# Patient Record
Sex: FEMALE | Race: WHITE | NOT HISPANIC OR LATINO
[De-identification: names, ages, dates, MRNs, and addresses within clinical notes are randomized per-mention and may not be internally consistent; named-entity substitution may affect disease eponyms.]

---

## 2020-02-10 ENCOUNTER — TRANSCRIPTION ENCOUNTER (OUTPATIENT)
Age: 35
End: 2020-02-10

## 2021-01-12 ENCOUNTER — NON-APPOINTMENT (OUTPATIENT)
Age: 36
End: 2021-01-12

## 2021-01-12 ENCOUNTER — APPOINTMENT (OUTPATIENT)
Dept: GYNECOLOGIC ONCOLOGY | Facility: CLINIC | Age: 36
End: 2021-01-12
Payer: COMMERCIAL

## 2021-01-12 VITALS
SYSTOLIC BLOOD PRESSURE: 119 MMHG | BODY MASS INDEX: 24.16 KG/M2 | WEIGHT: 145 LBS | HEIGHT: 65 IN | HEART RATE: 78 BPM | DIASTOLIC BLOOD PRESSURE: 73 MMHG

## 2021-01-12 DIAGNOSIS — Z80.3 FAMILY HISTORY OF MALIGNANT NEOPLASM OF BREAST: ICD-10-CM

## 2021-01-12 DIAGNOSIS — R63.5 ABNORMAL WEIGHT GAIN: ICD-10-CM

## 2021-01-12 DIAGNOSIS — Z87.2 PERSONAL HISTORY OF DISEASES OF THE SKIN AND SUBCUTANEOUS TISSUE: ICD-10-CM

## 2021-01-12 DIAGNOSIS — R23.2 FLUSHING: ICD-10-CM

## 2021-01-12 PROCEDURE — 99204 OFFICE O/P NEW MOD 45 MIN: CPT

## 2021-01-12 PROCEDURE — 99072 ADDL SUPL MATRL&STAF TM PHE: CPT

## 2021-01-12 RX ORDER — ALPRAZOLAM 2 MG/1
TABLET ORAL
Refills: 0 | Status: ACTIVE | COMMUNITY

## 2021-01-12 RX ORDER — DESOGESTREL/ETHINYL ESTRADIOL AND ETHINYL ESTRADIOL 21-5 (28)
KIT ORAL
Refills: 0 | Status: ACTIVE | COMMUNITY

## 2021-01-13 LAB — CANCER AG125 SERPL-ACNC: 63 U/ML

## 2021-01-23 ENCOUNTER — OUTPATIENT (OUTPATIENT)
Dept: OUTPATIENT SERVICES | Facility: HOSPITAL | Age: 36
LOS: 1 days | End: 2021-01-23

## 2021-01-23 ENCOUNTER — APPOINTMENT (OUTPATIENT)
Dept: CT IMAGING | Facility: CLINIC | Age: 36
End: 2021-01-23
Payer: COMMERCIAL

## 2021-01-23 PROCEDURE — 74177 CT ABD & PELVIS W/CONTRAST: CPT | Mod: 26

## 2021-03-23 ENCOUNTER — NON-APPOINTMENT (OUTPATIENT)
Age: 36
End: 2021-03-23

## 2021-03-23 LAB
ESTRADIOL SERPL-MCNC: 54 PG/ML
FSH SERPL-MCNC: 5.2 IU/L
TSH SERPL-ACNC: 0.63 UIU/ML

## 2022-01-12 NOTE — PHYSICAL EXAM
[Normal] : Adnexa(ae): Normal [de-identified] : Amirah Christopher MA was present the entire time of gynecological exam.

## 2022-01-12 NOTE — HISTORY OF PRESENT ILLNESS
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 She was previously being [place] [facility] [place]invasion was noted after [facility] [place]. She was evaluated with surveillance pelvic US and . She was initially seen by me in 2021 where we settled on a plan for annual surveillance, alternating with well woman GYN care with Dr. Elizabeth. CT a/p was warranted due to her reported symptoms over the 3-4 months prior to seeing me.\par \par CT a/p (21) - \par Findings of mild terminal ileitis/early Crohn's disease.\par Left oophorectomy without evidence of recurrent or metastatic disease.

## 2022-01-12 NOTE — END OF VISIT
[FreeTextEntry3] : Written by Amirah Christopher, acting as a scribe for Dr. Colleen oCffey.\par This note accurately reflects the work and decisions made by me.

## 2022-01-12 NOTE — ASSESSMENT
[FreeTextEntry1] : 37 y/o patient being followed for a history of serous borderline tumor. The patient is doing well from a gynecological standpoint.

## 2022-01-13 ENCOUNTER — APPOINTMENT (OUTPATIENT)
Dept: GYNECOLOGIC ONCOLOGY | Facility: CLINIC | Age: 37
End: 2022-01-13

## 2022-01-26 ENCOUNTER — FORM ENCOUNTER (OUTPATIENT)
Age: 37
End: 2022-01-26

## 2022-06-08 ENCOUNTER — APPOINTMENT (OUTPATIENT)
Dept: GYNECOLOGIC ONCOLOGY | Facility: CLINIC | Age: 37
End: 2022-06-08

## 2022-06-08 VITALS
HEIGHT: 65 IN | WEIGHT: 145 LBS | BODY MASS INDEX: 24.16 KG/M2 | SYSTOLIC BLOOD PRESSURE: 110 MMHG | DIASTOLIC BLOOD PRESSURE: 70 MMHG

## 2022-06-08 PROCEDURE — 99214 OFFICE O/P EST MOD 30 MIN: CPT

## 2022-06-08 RX ORDER — ONDANSETRON 4 MG/1
4 TABLET ORAL
Qty: 9 | Refills: 0 | Status: ACTIVE | COMMUNITY
Start: 2022-05-21

## 2022-06-08 RX ORDER — ALPRAZOLAM 0.25 MG/1
0.25 TABLET ORAL
Qty: 5 | Refills: 0 | Status: ACTIVE | COMMUNITY
Start: 2022-05-24

## 2022-06-08 RX ORDER — NORETHINDRONE ACETATE 5 MG/1
5 TABLET ORAL
Qty: 30 | Refills: 0 | Status: ACTIVE | COMMUNITY
Start: 2022-05-21

## 2022-06-08 RX ORDER — TRETINOIN 0.5 MG/G
0.05 CREAM TOPICAL
Qty: 45 | Refills: 0 | Status: ACTIVE | COMMUNITY
Start: 2022-04-09

## 2022-06-08 RX ORDER — TINIDAZOLE 500 MG/1
500 TABLET, FILM COATED ORAL
Qty: 10 | Refills: 0 | Status: ACTIVE | COMMUNITY
Start: 2022-03-02

## 2022-06-21 ENCOUNTER — RESULT REVIEW (OUTPATIENT)
Age: 37
End: 2022-06-21

## 2022-06-21 NOTE — REASON FOR VISIT
[FreeTextEntry1] : [place] Location\par \par [facility] [phone] at [address] [place] [id number] \par \par Hx of serous borderline tumor\par \par Hx of MAGDALENA III\par \par Most current: ASCUS, HR HPV+, 16/18 negative

## 2022-06-21 NOTE — END OF VISIT
[FreeTextEntry3] : Written by Amirah Christopher, acting as a scribe for Dr. Colleen Coffey.\par This note accurately reflects the work and decisions made by me.

## 2022-06-21 NOTE — ASSESSMENT
[FreeTextEntry1] : 37 y/o patient with hx of serous borderline tumor of the ovary. I discussed at length with the patient the risks, benefits, and alternatives to LEEP conization of the cervix.   She understands the risks to include (but not be limited to): cervical stenosis and possible infertility; infection with need for hospitalization; bleeding with need for transfusion; and cervical incompetence with difficulty holding future pregnancies to term.  The patient agrees to proceed.  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

## 2022-06-21 NOTE — HISTORY OF PRESENT ILLNESS
[FreeTextEntry1] : 37 y/o  with history of MAGDALENA III s/p LEEP in , now ASCUS, HR HPV + (16/18/45 negative) on most recent pap 3/2/22. Patient with hx of serous borderline tumor. In  she underwent a LSC LSO at [facility], with final pathology revealing a serous borderline tumor involving the serosal surface of the ovary. She was monitored by Dr. Flores (med/onc, [facility]), with a surveillance Pelvic US and .\par \par She had colpo done on 22 with multiple cervical biopsies. \par Cervix biopsy 3 o'clock: CIN1\par Cervix biopsy 5 o'clock: CIN2\par Cervix biopsy 9 o'clock: Cervical mucosa transformation zone w/ no diagnostic abnormalities\par Cervix biopsy 12 o'clock: Benign endocervical tissue\par ECC: Fragments of benign endocervix and interval phase endometrium\par \par Pelvic US (21) - \par RV uterus\par small amt of fluid within endometrial lining (pt at the tail of her menses. s/p left oophorectomy, cyst is now identified in the same region as previously described.

## 2022-06-21 NOTE — PHYSICAL EXAM
[Normal] : Recto-Vaginal Exam: Normal [FreeTextEntry1] : Amirah Christopher MA was present the entire time of gynecological exam.

## 2022-07-18 ENCOUNTER — FORM ENCOUNTER (OUTPATIENT)
Age: 37
End: 2022-07-18

## 2022-08-02 ENCOUNTER — OUTPATIENT (OUTPATIENT)
Dept: OUTPATIENT SERVICES | Facility: HOSPITAL | Age: 37
LOS: 1 days | End: 2022-08-02
Payer: COMMERCIAL

## 2022-08-02 DIAGNOSIS — Z01.818 ENCOUNTER FOR OTHER PREPROCEDURAL EXAMINATION: ICD-10-CM

## 2022-08-02 LAB
A1C WITH ESTIMATED AVERAGE GLUCOSE RESULT: 5.1 % — SIGNIFICANT CHANGE UP (ref 4–5.6)
ANION GAP SERPL CALC-SCNC: 8 MMOL/L — SIGNIFICANT CHANGE UP (ref 5–17)
BASOPHILS # BLD AUTO: 0.04 K/UL — SIGNIFICANT CHANGE UP (ref 0–0.2)
BASOPHILS NFR BLD AUTO: 0.6 % — SIGNIFICANT CHANGE UP (ref 0–2)
BLD GP AB SCN SERPL QL: SIGNIFICANT CHANGE UP
BUN SERPL-MCNC: 13.6 MG/DL — SIGNIFICANT CHANGE UP (ref 8–20)
CALCIUM SERPL-MCNC: 9.6 MG/DL — SIGNIFICANT CHANGE UP (ref 8.4–10.5)
CHLORIDE SERPL-SCNC: 103 MMOL/L — SIGNIFICANT CHANGE UP (ref 98–107)
CO2 SERPL-SCNC: 27 MMOL/L — SIGNIFICANT CHANGE UP (ref 22–29)
CREAT SERPL-MCNC: 0.85 MG/DL — SIGNIFICANT CHANGE UP (ref 0.5–1.3)
EGFR: 90 ML/MIN/1.73M2 — SIGNIFICANT CHANGE UP
EOSINOPHIL # BLD AUTO: 0.12 K/UL — SIGNIFICANT CHANGE UP (ref 0–0.5)
EOSINOPHIL NFR BLD AUTO: 1.7 % — SIGNIFICANT CHANGE UP (ref 0–6)
ESTIMATED AVERAGE GLUCOSE: 100 MG/DL — SIGNIFICANT CHANGE UP (ref 68–114)
GLUCOSE SERPL-MCNC: 97 MG/DL — SIGNIFICANT CHANGE UP (ref 70–99)
HCG SERPL-ACNC: <4 MIU/ML — SIGNIFICANT CHANGE UP
HCT VFR BLD CALC: 40.2 % — SIGNIFICANT CHANGE UP (ref 34.5–45)
HGB BLD-MCNC: 13.3 G/DL — SIGNIFICANT CHANGE UP (ref 11.5–15.5)
IMM GRANULOCYTES NFR BLD AUTO: 0.3 % — SIGNIFICANT CHANGE UP (ref 0–1.5)
LYMPHOCYTES # BLD AUTO: 2.02 K/UL — SIGNIFICANT CHANGE UP (ref 1–3.3)
LYMPHOCYTES # BLD AUTO: 28 % — SIGNIFICANT CHANGE UP (ref 13–44)
MCHC RBC-ENTMCNC: 30.6 PG — SIGNIFICANT CHANGE UP (ref 27–34)
MCHC RBC-ENTMCNC: 33.1 GM/DL — SIGNIFICANT CHANGE UP (ref 32–36)
MCV RBC AUTO: 92.4 FL — SIGNIFICANT CHANGE UP (ref 80–100)
MONOCYTES # BLD AUTO: 0.51 K/UL — SIGNIFICANT CHANGE UP (ref 0–0.9)
MONOCYTES NFR BLD AUTO: 7.1 % — SIGNIFICANT CHANGE UP (ref 2–14)
NEUTROPHILS # BLD AUTO: 4.51 K/UL — SIGNIFICANT CHANGE UP (ref 1.8–7.4)
NEUTROPHILS NFR BLD AUTO: 62.3 % — SIGNIFICANT CHANGE UP (ref 43–77)
PLATELET # BLD AUTO: 234 K/UL — SIGNIFICANT CHANGE UP (ref 150–400)
POTASSIUM SERPL-MCNC: 4.6 MMOL/L — SIGNIFICANT CHANGE UP (ref 3.5–5.3)
POTASSIUM SERPL-SCNC: 4.6 MMOL/L — SIGNIFICANT CHANGE UP (ref 3.5–5.3)
RBC # BLD: 4.35 M/UL — SIGNIFICANT CHANGE UP (ref 3.8–5.2)
RBC # FLD: 13.1 % — SIGNIFICANT CHANGE UP (ref 10.3–14.5)
SODIUM SERPL-SCNC: 138 MMOL/L — SIGNIFICANT CHANGE UP (ref 135–145)
WBC # BLD: 7.22 K/UL — SIGNIFICANT CHANGE UP (ref 3.8–10.5)
WBC # FLD AUTO: 7.22 K/UL — SIGNIFICANT CHANGE UP (ref 3.8–10.5)

## 2022-08-02 PROCEDURE — G0463: CPT

## 2022-08-15 LAB — SARS-COV-2 N GENE NPH QL NAA+PROBE: NOT DETECTED

## 2022-08-16 ENCOUNTER — RESULT REVIEW (OUTPATIENT)
Age: 37
End: 2022-08-16

## 2022-08-22 ENCOUNTER — NON-APPOINTMENT (OUTPATIENT)
Age: 37
End: 2022-08-22

## 2022-09-13 ENCOUNTER — NON-APPOINTMENT (OUTPATIENT)
Age: 37
End: 2022-09-13

## 2022-09-13 DIAGNOSIS — B96.89 ACUTE VAGINITIS: ICD-10-CM

## 2022-09-13 DIAGNOSIS — N76.0 ACUTE VAGINITIS: ICD-10-CM

## 2022-09-21 ENCOUNTER — APPOINTMENT (OUTPATIENT)
Dept: GYNECOLOGIC ONCOLOGY | Facility: CLINIC | Age: 37
End: 2022-09-21

## 2022-09-21 VITALS
DIASTOLIC BLOOD PRESSURE: 62 MMHG | BODY MASS INDEX: 24.16 KG/M2 | TEMPERATURE: 98.4 F | HEIGHT: 65 IN | WEIGHT: 145 LBS | SYSTOLIC BLOOD PRESSURE: 96 MMHG

## 2022-09-21 PROCEDURE — 99024 POSTOP FOLLOW-UP VISIT: CPT

## 2022-10-04 NOTE — DISCUSSION/SUMMARY
[Cervical Abnormality] : normal cervix [External Genitalia Abnormal] : normal external genitalia [Vaginal Exam Abnormal] : normal vaginal exam [de-identified] : Amirah Christopher MA was present the entire time of gynecological exam.  [FreeTextEntry1] : OPERATIVE FINDINGS: With Lugol solution, slight loss of uptake near the 5:00 position compatible with preoperative colposcopic-directed biopsies. No gross cervical lesions. Shallow LEEP cone biopsy performed. \par \par PATHOLOGY: \par 1. Cervix at 12 o'clock, LEEP-cone biopsy:\par \par - Benign squamous mucosa with acute, chronic inflammation and\par reactive changes.\par - Benign endocervical tissue.\par \par 2. Endocervix, post-LEEP curettings:\par - Benign endocervical and lower uterine segment tissue.\par \par 3. Left lateral margin of cervix, LEEP-cone biopsy:\par - Benign squamous and endocervical mucosa.\par \par Note: In parts 1 and 3, Each block has been reversed 180 degree and sectioned deeper levels for microscopic examination. There is no evidence of squamous intraepithelial lesion in all sections examined.

## 2022-10-04 NOTE — REASON FOR VISIT
[de-identified] : 08/16/22 [de-identified] : Pelvis EUA, LEEP, Cone biopsy of cervix for MAGDALENA 2-3 at [facility] [de-identified] : The patient is healing remarkably well. She denies a change in appetite, or a change in weight. She is tolerating PO without nausea or vomiting. She denies VB/VD. She denies CP/SOB.

## 2022-10-04 NOTE — ASSESSMENT
[FreeTextEntry1] : The patient is healing well without complication. We discussed ongoing recuperation and reviewed final pathology results in detail - a copy was provided to the patient. Aptima swab was obtained in office due to reported VD. I also ordered TV US for interval f/u of retained ovary to r/o recurrent borderline tumor. She should schedule telehealth to review. We reviewed the need for ongoing GYN visits and a recommended plan to follow up with regular GYN. Per patient request, she will return in 1 year to continue interval surveillance. She should have pelvic/TV US every 6 months. The patient stated and expressed a good understanding of the above information. \par \par Patient may be interested in fertility consult due to her age and since she has only one ovary, but will consider scheduling an appointment next year.

## 2022-11-15 ENCOUNTER — APPOINTMENT (OUTPATIENT)
Dept: GYNECOLOGIC ONCOLOGY | Facility: CLINIC | Age: 37
End: 2022-11-15

## 2022-11-15 ENCOUNTER — APPOINTMENT (OUTPATIENT)
Dept: ULTRASOUND IMAGING | Facility: CLINIC | Age: 37
End: 2022-11-15

## 2022-11-15 PROCEDURE — 76705 ECHO EXAM OF ABDOMEN: CPT

## 2022-11-15 PROCEDURE — 76830 TRANSVAGINAL US NON-OB: CPT

## 2022-11-15 PROCEDURE — 76856 US EXAM PELVIC COMPLETE: CPT

## 2022-11-15 PROCEDURE — 99442: CPT

## 2022-11-28 NOTE — END OF VISIT
[Time Spent: ___ minutes] : I have spent [unfilled] minutes of time on the encounter. [FreeTextEntry3] : Written by Amirah Christopher, acting as a scribe for Dr. Colleen Coffey.\par This note accurately reflects the work and decisions made by me.

## 2022-11-28 NOTE — HISTORY OF PRESENT ILLNESS
[FreeTextEntry1] : 38 y/o patient s/p Pelvic EUA, LEEP, Cone biopsy of cervix for MAGDALENA 2-3 on 8/16/22. Patient was seen postoperatively on 9/21/222 when she was advised to resume routine well woman care. Per her request, patient will continue annual f/u with me given her hx of borderline tumor of the ovary. I recommended she have pelvic/TV US done every 6 months. She presents today to review most recent US.  Her only symptom would be intermittent bloating which she associates with known IBS. \par \par Pelvic/TV US (11/15/22) - \par - Fibroid uterus with small fibroid nodules.\par - Normal endometrial lining.\par - Patient is status post left oophorectomy.\par - Right ovary contains a cyst with solid extension. If this had been present on previous examination, it is now larger in size. Close follow-up or MRI of the pelvis is recommended for further evaluation. Small dermoid cyst more difficult to see on today's exam.

## 2022-11-28 NOTE — ASSESSMENT
[FreeTextEntry1] : 36 y/o patient with hx of MAGDALENA 2-3 & borderline tumor of the ovary. I reviewed her recent pelvic US which shows some growth in the known cyst within the R ovary. Given these findings, I would recommend patient have ALEJANDRO blood test & MRI A/P to determine whether she is developing another borderline tumor in the right ovary given complex appearance when compared with US from 1 year ago. \par \par Patient unsure of childbearing status at the present moment. She should f/u after her MRI & blood work are done to review these results.

## 2022-11-28 NOTE — REASON FOR VISIT
[Patient] : the patient [Self] : self [Home] : at home, [unfilled] , at the time of the visit. [Medical Office: ([facility])___] : at the medical office located in  [Verbal consent obtained from patient] : the patient, [unfilled] [FreeTextEntry1] : [place] Location\par \par [facility] [phone] at [address] [place] [phone] \par \par Hx of borderline tumor of the ovary\par \par MAGDALENA 2-3

## 2022-11-29 ENCOUNTER — RESULT REVIEW (OUTPATIENT)
Age: 37
End: 2022-11-29

## 2022-11-29 ENCOUNTER — OUTPATIENT (OUTPATIENT)
Dept: OUTPATIENT SERVICES | Facility: HOSPITAL | Age: 37
LOS: 1 days | End: 2022-11-29

## 2022-11-29 ENCOUNTER — APPOINTMENT (OUTPATIENT)
Dept: MRI IMAGING | Facility: CLINIC | Age: 37
End: 2022-11-29

## 2022-11-29 PROCEDURE — 74183 MRI ABD W/O CNTR FLWD CNTR: CPT | Mod: 26

## 2022-11-29 PROCEDURE — 72197 MRI PELVIS W/O & W/DYE: CPT | Mod: 26

## 2022-12-05 LAB
CA 125 (LABCORP): 20.5 U/ML
HE4X: 46.5 PMOL/L
POSTMENOPAUSAL ROMA: 1.32
PREMENOPAUSAL ROMA: 0.65
ROMA COMMENT: NORMAL

## 2022-12-08 ENCOUNTER — APPOINTMENT (OUTPATIENT)
Dept: GYNECOLOGIC ONCOLOGY | Facility: CLINIC | Age: 37
End: 2022-12-08

## 2022-12-08 PROCEDURE — 99442: CPT

## 2022-12-14 ENCOUNTER — FORM ENCOUNTER (OUTPATIENT)
Age: 37
End: 2022-12-14

## 2022-12-14 ENCOUNTER — APPOINTMENT (OUTPATIENT)
Dept: GYNECOLOGIC ONCOLOGY | Facility: CLINIC | Age: 37
End: 2022-12-14

## 2022-12-14 PROCEDURE — 99442: CPT

## 2022-12-15 NOTE — DISCUSSION/SUMMARY
[Pre Op] : The differential diagnosis was discussed in detail. The indications, risks, benefits and alternatives were discussed. [unfilled] expressed an understanding of the treatment rationale and her questions were answered to her apparent satisfaction.

## 2022-12-20 NOTE — REASON FOR VISIT
[Medical Office: ([facility])___] : at the medical office located in  [Verbal consent obtained from patient] : the patient, [unfilled] [Other Location: e.g. School (Enter Location, City,State)___] : at [unfilled], at the time of the visit. [FreeTextEntry1] : Hx of borderline tumor of the ovary \par \par MAGDALENA 2-3

## 2022-12-20 NOTE — ASSESSMENT
[FreeTextEntry1] : 38 y/o patient with a history of borderline tumor of the L ovary, now with R ovarian cyst on MRI & normal ALEJANDRO.  Patient is interested in preserving her ovaries as she is not ready to endure surgically induced menopause. Based on the imaging showing cystic appearance & only partial involvement of the ovary, I do not find it unreasonable to retain the R ovary although I am inclined to believe that final path will show recurrent borderline tumor in the R ovary.\par \par Patient is a nurse. We discussed 2 week minimum time off of work for surgery.  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 Patient interested in scheduling surgery for early January.

## 2022-12-20 NOTE — HISTORY OF PRESENT ILLNESS
[FreeTextEntry1] : 38 y/o patient s/p Pelvic EUA, LEEP, Cone biopsy of cervix for MAGDALENA 2-3 on 8/16/22. Patient was seen postoperatively on 9/21/222 when she was advised to resume routine well woman care. Per her request, patient will continue annual f/u with me given her hx of borderline tumor of the ovary. I recommended she have pelvic/TV US done every 6 months. We had a teleleaht on 11/15/22 to review her US. Her only symptom was intermittent bloating which she associates with known IBS. \par \par Pelvic/TV US (11/15/22) - \par - Fibroid uterus with small fibroid nodules.\par - Normal endometrial lining.\par - Patient is status post left oophorectomy.\par - Right ovary contains a cyst with solid extension. If this had been present on previous examination, it is now larger in size. Close follow-up or MRI of the pelvis is recommended for further evaluation. Small dermoid cyst more difficult to see on today's exam. \par \par I reviewed US findings with patient which showed some growth in the known cyst within the R ovary. I recommended patient have a ALEJANDRO blood test & MRI A/P to determine whether she is developing another borderline tumor on the right ovary given complex appearance  when compared to US from 1 year ago \par \par Patient was unsure of her child bearing status at last visit. She has a telehealth visit today to discuss results. \par \par MRI abdomen and pelvis 11/29/22: IMPRESSION:\par 3.3 cm right ovarian cystic lesion with multiple enhancing mural nodules suspicious for neoplasm. It has enlarged from 2021.\par 2 cm simple cyst left adnexa, mildly enlarged from 2021, ?peritoneal inclusion/ paraovarian cyst versus cystic neoplasm, suggest follow-up.\par No lymphadenopathy or metastatic disease.\par \par ALEJANDRO: 12/2/22: POST 1.32\par : 20.5\par PRE: 0.65\par HE4: 46.5\par \par I left a message for patient on 12/5/22 with interest in discussing ALEJANDRO results and recent pelvis US and MRI imaging. Recommend LSC R ovarian cystectomy, possible oophorectomy and consideration of DANETTE consult prior to oocyte cryopreservation. \par

## 2022-12-20 NOTE — HISTORY OF PRESENT ILLNESS
[FreeTextEntry1] : 38 y/o patient with a hx of borderline tumor of the ovary & high grade cervical dysplasia, s/p Pelvic EUA, LEEP, Cone biopsy of cervix for MAGDALENA 2-3 on 8/16/22. Pelvic US done on 11/15/22 to r/o recurrent borderline tumor revealed growth in the known cyst within the R ovary. MRI A/P & ALEJANDRO subsequently ordered for additional evaluation given complex appearance when compared to prior US.  I am reassured by normal ALEJANDRO result although due to imaging characteristic of R ovarian cyst, we discussed my concern for recurrent borderline tumor provided that MRI shows larger ovaries & enhancing mural nodules.  I recommended LSC R ovarian cystectomy, possible oophorectomy and consideration of DANETTE consult prior to oocyte cryopreservation - Dr. Ai Rene information was provided to the pt.  Patient has decided to discontinue oocyte preservation provided that she is  & is not inclined to proceed with another procedure. \par \par Potential plan for PEUA RA Lap right ovarian cystectomy with FS, possible R oophorectomy, with intent of pathologist review to r/o borderline tumor. If confirmed as cancer, plan to resect the R ovary in addition to staging & lymph node sampling.   As long as uterus surface is not diseased, can plan for fertility sparing procedure.\par \par \par MRI abdomen and pelvis 11/29/22: IMPRESSION:\par 3.3 cm right ovarian cystic lesion with multiple enhancing mural nodules suspicious for neoplasm. It has enlarged from 2021.\par 2 cm simple cyst left adnexa, mildly enlarged from 2021, ?peritoneal inclusion/ paraovarian cyst versus cystic neoplasm, suggest follow-up.\par No lymphadenopathy or metastatic disease.\par \par ALEJANDRO: 12/2/22: POST 1.32\par : 20.5\par PRE: 0.65\par HE4: 46.5\par

## 2022-12-20 NOTE — REASON FOR VISIT
[Patient] : the patient [Self] : self [Home] : at home, [unfilled] , at the time of the visit. [Medical Office: ([facility])___] : at the medical office located in  [Verbal consent obtained from patient] : the patient, [unfilled] [FreeTextEntry1] : [place] Location\par \par [facility] [phone] at [address] [place] [phone] \par \par Hx of borderline tumor of the ovary \par \par MAGDALENA 2-3 \par \par Discuss final treatment plan

## 2022-12-20 NOTE — ASSESSMENT
[FreeTextEntry1] : Patient reports receiving my messages. \par \par I discussed with patient that the ALEJANDRO blood test was important to help strategize and make decision for patient. ALEJANDRO WNL. Imaging characteristic of the ovary is not ideal, based on Sonogram and MRI warrant a confirmatory image of laparoscopic surgical evaluation with understanding if we do it in January or Febuary would need US prior to make sure finding is still there. Naturally because of the fact features enhancing mural nodules, ovaries a bit larger, could be compatible with recurrent borderline tumor in this ovary. \par As long as ALEJANDRO is wnl it buys time to have cautiously scheduled surgery and give time to think about fertility care. Patient was considering freezing her eggs. Can recap when she gets back from [place]. If she is considering future child bearing patient should have a consultation with Dr. Artemio Rene fertility specialist. \par \par I explained that cyst removal will be my goal however if this is a borderline tumor there is a chance of losing ovary. Discussed 15-20 percent chance repeat surgery to remove ovary if it recurs \par \par RA Lap right ovarian cystectomy with FS with intent of pathologist to look and see if they see borderline tumor. If they tell me its cancer we will have agreement that ovary will come out and staging with lymph node samples. \par As long as uterus surface not diseased we can leave uterus and fertility sparing procedure. depending on final path results will discuss further treatment if necessary. \par \par Since patient is at [place] at this time My office will message via Otilia Rene fertility specialist info. \par \par [place] or [facility] system.  Detail Level: Detailed Quality 110: Preventive Care And Screening: Influenza Immunization: Influenza Immunization previously received during influenza season Quality 130: Documentation Of Current Medications In The Medical Record: Current Medications Documented Quality 111:Pneumonia Vaccination Status For Older Adults: Pneumococcal Vaccination Previously Received

## 2022-12-20 NOTE — PHYSICAL EXAM
[Normal] : Mood and affect: Normal [FreeTextEntry1] : Anali Waters Medical assistant was present during telehealth visit

## 2022-12-20 NOTE — END OF VISIT
[Time Spent: ___ minutes] : I have spent [unfilled] minutes of time on the encounter. [FreeTextEntry3] : Written by Anali Waters, acting as a scribe for Dr. Colleen Neff This note accurately reflects the work and decisions made by me.

## 2022-12-27 ENCOUNTER — NON-APPOINTMENT (OUTPATIENT)
Age: 37
End: 2022-12-27

## 2022-12-28 ENCOUNTER — OUTPATIENT (OUTPATIENT)
Dept: OUTPATIENT SERVICES | Facility: HOSPITAL | Age: 37
LOS: 1 days | End: 2022-12-28
Payer: COMMERCIAL

## 2022-12-28 VITALS
DIASTOLIC BLOOD PRESSURE: 64 MMHG | RESPIRATION RATE: 18 BRPM | OXYGEN SATURATION: 98 % | TEMPERATURE: 99 F | SYSTOLIC BLOOD PRESSURE: 100 MMHG | HEART RATE: 91 BPM | HEIGHT: 65 IN | WEIGHT: 133.38 LBS

## 2022-12-28 DIAGNOSIS — D39.12 NEOPLASM OF UNCERTAIN BEHAVIOR OF LEFT OVARY: ICD-10-CM

## 2022-12-28 DIAGNOSIS — Z90.721 ACQUIRED ABSENCE OF OVARIES, UNILATERAL: Chronic | ICD-10-CM

## 2022-12-28 DIAGNOSIS — Z98.82 BREAST IMPLANT STATUS: Chronic | ICD-10-CM

## 2022-12-28 DIAGNOSIS — Z29.9 ENCOUNTER FOR PROPHYLACTIC MEASURES, UNSPECIFIED: ICD-10-CM

## 2022-12-28 DIAGNOSIS — Z98.890 OTHER SPECIFIED POSTPROCEDURAL STATES: Chronic | ICD-10-CM

## 2022-12-28 DIAGNOSIS — Z01.818 ENCOUNTER FOR OTHER PREPROCEDURAL EXAMINATION: ICD-10-CM

## 2022-12-28 LAB
A1C WITH ESTIMATED AVERAGE GLUCOSE RESULT: 5 % — SIGNIFICANT CHANGE UP (ref 4–5.6)
ALBUMIN SERPL ELPH-MCNC: 4.3 G/DL — SIGNIFICANT CHANGE UP (ref 3.3–5.2)
ALP SERPL-CCNC: 49 U/L — SIGNIFICANT CHANGE UP (ref 40–120)
ALT FLD-CCNC: 18 U/L — SIGNIFICANT CHANGE UP
ANION GAP SERPL CALC-SCNC: 11 MMOL/L — SIGNIFICANT CHANGE UP (ref 5–17)
APTT BLD: 29 SEC — SIGNIFICANT CHANGE UP (ref 27.5–35.5)
AST SERPL-CCNC: 18 U/L — SIGNIFICANT CHANGE UP
BASOPHILS # BLD AUTO: 0.06 K/UL — SIGNIFICANT CHANGE UP (ref 0–0.2)
BASOPHILS NFR BLD AUTO: 0.8 % — SIGNIFICANT CHANGE UP (ref 0–2)
BILIRUB SERPL-MCNC: 1.9 MG/DL — SIGNIFICANT CHANGE UP (ref 0.4–2)
BLD GP AB SCN SERPL QL: SIGNIFICANT CHANGE UP
BUN SERPL-MCNC: 12.5 MG/DL — SIGNIFICANT CHANGE UP (ref 8–20)
CALCIUM SERPL-MCNC: 9.3 MG/DL — SIGNIFICANT CHANGE UP (ref 8.4–10.5)
CHLORIDE SERPL-SCNC: 103 MMOL/L — SIGNIFICANT CHANGE UP (ref 96–108)
CO2 SERPL-SCNC: 27 MMOL/L — SIGNIFICANT CHANGE UP (ref 22–29)
CREAT SERPL-MCNC: 0.7 MG/DL — SIGNIFICANT CHANGE UP (ref 0.5–1.3)
EGFR: 113 ML/MIN/1.73M2 — SIGNIFICANT CHANGE UP
EOSINOPHIL # BLD AUTO: 0.11 K/UL — SIGNIFICANT CHANGE UP (ref 0–0.5)
EOSINOPHIL NFR BLD AUTO: 1.4 % — SIGNIFICANT CHANGE UP (ref 0–6)
ESTIMATED AVERAGE GLUCOSE: 97 MG/DL — SIGNIFICANT CHANGE UP (ref 68–114)
GLUCOSE SERPL-MCNC: 83 MG/DL — SIGNIFICANT CHANGE UP (ref 70–99)
HCG SERPL-ACNC: <4 MIU/ML — SIGNIFICANT CHANGE UP
HCT VFR BLD CALC: 40.8 % — SIGNIFICANT CHANGE UP (ref 34.5–45)
HGB BLD-MCNC: 13.7 G/DL — SIGNIFICANT CHANGE UP (ref 11.5–15.5)
IMM GRANULOCYTES NFR BLD AUTO: 0.3 % — SIGNIFICANT CHANGE UP (ref 0–0.9)
INR BLD: 1 RATIO — SIGNIFICANT CHANGE UP (ref 0.88–1.16)
LYMPHOCYTES # BLD AUTO: 2.1 K/UL — SIGNIFICANT CHANGE UP (ref 1–3.3)
LYMPHOCYTES # BLD AUTO: 27.2 % — SIGNIFICANT CHANGE UP (ref 13–44)
MAGNESIUM SERPL-MCNC: 1.9 MG/DL — SIGNIFICANT CHANGE UP (ref 1.6–2.6)
MCHC RBC-ENTMCNC: 30.4 PG — SIGNIFICANT CHANGE UP (ref 27–34)
MCHC RBC-ENTMCNC: 33.6 GM/DL — SIGNIFICANT CHANGE UP (ref 32–36)
MCV RBC AUTO: 90.5 FL — SIGNIFICANT CHANGE UP (ref 80–100)
MONOCYTES # BLD AUTO: 0.56 K/UL — SIGNIFICANT CHANGE UP (ref 0–0.9)
MONOCYTES NFR BLD AUTO: 7.3 % — SIGNIFICANT CHANGE UP (ref 2–14)
NEUTROPHILS # BLD AUTO: 4.86 K/UL — SIGNIFICANT CHANGE UP (ref 1.8–7.4)
NEUTROPHILS NFR BLD AUTO: 63 % — SIGNIFICANT CHANGE UP (ref 43–77)
PHOSPHATE SERPL-MCNC: 3.5 MG/DL — SIGNIFICANT CHANGE UP (ref 2.4–4.7)
PLATELET # BLD AUTO: 227 K/UL — SIGNIFICANT CHANGE UP (ref 150–400)
POTASSIUM SERPL-MCNC: 4.3 MMOL/L — SIGNIFICANT CHANGE UP (ref 3.5–5.3)
POTASSIUM SERPL-SCNC: 4.3 MMOL/L — SIGNIFICANT CHANGE UP (ref 3.5–5.3)
PROT SERPL-MCNC: 6.7 G/DL — SIGNIFICANT CHANGE UP (ref 6.6–8.7)
PROTHROM AB SERPL-ACNC: 11.6 SEC — SIGNIFICANT CHANGE UP (ref 10.5–13.4)
RBC # BLD: 4.51 M/UL — SIGNIFICANT CHANGE UP (ref 3.8–5.2)
RBC # FLD: 13.1 % — SIGNIFICANT CHANGE UP (ref 10.3–14.5)
SODIUM SERPL-SCNC: 141 MMOL/L — SIGNIFICANT CHANGE UP (ref 135–145)
WBC # BLD: 7.71 K/UL — SIGNIFICANT CHANGE UP (ref 3.8–10.5)
WBC # FLD AUTO: 7.71 K/UL — SIGNIFICANT CHANGE UP (ref 3.8–10.5)

## 2022-12-28 PROCEDURE — G0463: CPT

## 2022-12-28 NOTE — H&P PST ADULT - GASTROINTESTINAL
details… normal/soft/nontender/nondistended/normal active bowel sounds normal/soft/nontender/nondistended/normal active bowel sounds/no guarding/no rigidity/no organomegaly/no palpable jimi/no masses palpable

## 2022-12-28 NOTE — H&P PST ADULT - ATTENDING COMMENTS
Patient seen in presurgical holding with her mother present for the informed consent discussion.  R/B/A were reviewed & understood; consent is signed & witnessed in the chart.

## 2022-12-28 NOTE — H&P PST ADULT - NEUROLOGICAL
H/O gastroesophageal reflux (GERD)    HTN (hypertension)    OA (osteoarthritis)     normal/cranial nerves II-XII intact/sensation intact negative

## 2022-12-28 NOTE — H&P PST ADULT - BMI (KG/M2)
CHIEF COMPLAINT: Hip pain better. no nausea or vomiting or fever. uncontrolled blood sugars and blood pressures still no active gross bleeding on eliquis       PHYSICAL EXAM:  GENERAL: well built, well nourished, no acute distress   CHEST/LUNG: Clear to ausculation bilaterally, no wheezing, no crackles   HEART: Regular rate and rhythm; No murmurs, rubs  ABDOMEN: Soft, Nontender, Nondistended; Bowel sounds present  EXTREMITIES:  s/p left foot amputation. dressing +. no cyanosis.   NERVOUS SYSTEM:  Grossly non focal.  Psychiatry: AAO x 3, mood is appropriate     -----------------------------------------------------------------------------------------------------------------------------------------------------------------  OBJECTIVE DATA:                     Vital Signs Last 24 Hrs  T(C): 36.6 (15 Yann 2020 11:50), Max: 36.6 (15 Yann 2020 11:50)  T(F): 97.9 (15 Yann 2020 11:50), Max: 97.9 (15 Yann 2020 11:50)  HR: 69 (15 Yann 2020 11:50) (69 - 95)  BP: 148/79 (15 Yann 2020 11:50) (148/79 - 190/90)  BP(mean): --  RR: 16 (15 Yann 2020 11:50) (16 - 18)  SpO2: 98% (15 Yann 2020 04:52) (98% - 99%)           Daily     Daily Weight in k (15 Yann 2020 04:52)      LABS:                        8.2    20.05 )-----------( 533      ( 15 Yann 2020 07:59 )             25.5             01-15    136  |  105  |  33<H>  ----------------------------<  229<H>  4.6   |  25  |  1.70<H>    Ca    8.7      15 Yann 2020 07:59                        Interval Radiology studies: reviewed     MEDICATIONS  (STANDING):  clindamycin IVPB 900 milliGRAM(s) IV Intermittent every 8 hours  dextrose 5%. 1000 milliLiter(s) (50 mL/Hr) IV Continuous <Continuous>  dextrose 50% Injectable 12.5 Gram(s) IV Push once  dextrose 50% Injectable 25 Gram(s) IV Push once  dextrose 50% Injectable 25 Gram(s) IV Push once  diltiazem    milliGRAM(s) Oral daily  doxazosin 2 milliGRAM(s) Oral at bedtime  hydrALAZINE 50 milliGRAM(s) Oral three times a day  insulin glargine Injectable (LANTUS) 17 Unit(s) SubCutaneous at bedtime  insulin lispro (HumaLOG) corrective regimen sliding scale   SubCutaneous three times a day before meals  insulin lispro (HumaLOG) corrective regimen sliding scale   SubCutaneous at bedtime  insulin lispro Injectable (HumaLOG) 5 Unit(s) SubCutaneous three times a day before meals  lidocaine   Patch 1 Patch Transdermal every 24 hours  melatonin 3 milliGRAM(s) Oral at bedtime  nystatin Cream 1 Application(s) Topical every 12 hours  penicillin   G  potassium  IVPB 4 Million Unit(s) IV Intermittent every 4 hours  saccharomyces boulardii 250 milliGRAM(s) Oral two times a day    MEDICATIONS  (PRN):  acetaminophen   Tablet .. 650 milliGRAM(s) Oral every 6 hours PRN Temp greater or equal to 38C (100.4F), Mild Pain (1 - 3)  ALBUTerol   0.042% 1.25 milliGRAM(s) Nebulizer four times a day PRN Wheezing  dextrose 40% Gel 15 Gram(s) Oral once PRN Blood Glucose LESS THAN 70 milliGRAM(s)/deciliter  glucagon  Injectable 1 milliGRAM(s) IntraMuscular once PRN Glucose LESS THAN 70 milligrams/deciliter  guaiFENesin   Syrup  (Sugar-Free) 100 milliGRAM(s) Oral every 6 hours PRN Cough  hydrALAZINE Injectable 10 milliGRAM(s) IV Push every 6 hours PRN SUSTAINED SBP >180 OR DBP >100  morphine  - Injectable 2 milliGRAM(s) IV Push every 4 hours PRN Severe Pain (7 - 10)  oxycodone    5 mG/acetaminophen 325 mG 1 Tablet(s) Oral every 4 hours PRN Moderate Pain (4 - 6)  sodium chloride 0.65% Nasal 1 Spray(s) Both Nostrils three times a day PRN Nasal Congestion 22.2

## 2022-12-28 NOTE — H&P PST ADULT - HISTORY OF PRESENT ILLNESS
Pt  , LMP 2022had borderline ovarian tumor many about nine years of the left side  intermittent abdominal pain, occasional bloating, nausea/emisis 10 lbs weight loss within the last one year. This routine songogram since left side surgery  revealed abnormality since    37 years old female , LMP 2022, seen today pre-op for Pelvic exam under anesthesia, Robotic assisted  laparoscopic right ovary cystectomy with frozen section, possible oophorectomy, possible staging. 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  Pt has since been on surveillance monitoring of right ovary.  Pt reports  intermittent abdominal pain, occasional bloating, nausea, about 10 lbs weight loss within the last one year. Pt seen today for a scheduled surgery on 2023 by Dr. Coffey

## 2022-12-28 NOTE — H&P PST ADULT - ASSESSMENT
37 years old female , LMP 2022, seen today pre-op for Pelvic exam under anesthesia, Robotic assisted  laparoscopic right ovary cystectomy with frozen section, possible oophorectomy, possible staging. 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  Pt has since been on surveillance monitoring of right ovary.  Pt reports  intermittent abdominal pain, occasional bloating, nausea, about 10 lbs weight loss within the last one year. Pt seen today for a scheduled surgery on 2023 by Dr. Coffey. Surgery protocol reviewed with Pt today. Pt provided instructions for COVID PCR test requirement prior to this surgery. Pt to follow-up with PCP for medical clearances as per Dr. Merritt office request   KEITHI VTE 2.0 SCORE [CLOT updated 2019]    AGE RELATED RISK FACTORS                                                       MOBILITY RELATED FACTORS  [ ] Age 41-60 years                                            (1 Point)                    [ ] Bed rest                                                        (1 Point)  [ ] Age: 61-74 years                                           (2 Points)                  [ ] Plaster cast                                                   (2 Points)  [ ] Age= 75 years                                              (3 Points)                    [ ] Bed bound for more than 72 hours                 (2 Points)    DISEASE RELATED RISK FACTORS                                               GENDER SPECIFIC FACTORS  [ ] Edema in the lower extremities                       (1 Point)              [ ] Pregnancy                                                     (1 Point)  [ ] Varicose veins                                               (1 Point)                     [ ] Post-partum < 6 weeks                                   (1 Point)             [ ] BMI > 25 Kg/m2                                            (1 Point)                     [ ] Hormonal therapy  or oral contraception          (1 Point)                 [ ] Sepsis (in the previous month)                        (1 Point)               [ ] History of pregnancy complications                 (1 point)  [ ] Pneumonia or serious lung disease                                               [ ] Unexplained or recurrent                     (1 Point)           (in the previous month)                               (1 Point)  [ ] Abnormal pulmonary function test                     (1 Point)                 SURGERY RELATED RISK FACTORS  [ ] Acute myocardial infarction                              (1 Point)               [ ]  Section                                             (1 Point)  [ ] Congestive heart failure (in the previous month)  (1 Point)      [ ] Minor surgery                                                  (1 Point)   [ ] Inflammatory bowel disease                             (1 Point)               [ ] Arthroscopic surgery                                        (2 Points)  [ ] Central venous access                                      (2 Points)                [x ] General surgery lasting more than 45 minutes (2 points)  [ ] Malignancy- Present or previous                   (2 Points)                [ ] Elective arthroplasty                                         (5 points)    [ ] Stroke (in the previous month)                          (5 Points)                                                                                                                                                           HEMATOLOGY RELATED FACTORS                                                 TRAUMA RELATED RISK FACTORS  [ ] Prior episodes of VTE                                     (3 Points)                [ ] Fracture of the hip, pelvis, or leg                       (5 Points)  [ ] Positive family history for VTE                         (3 Points)             [ ] Acute spinal cord injury (in the previous month)  (5 Points)  [ ] Prothrombin 97591 A                                     (3 Points)               [ ] Paralysis  (less than 1 month)                             (5 Points)  [ ] Factor V Leiden                                             (3 Points)                  [ ] Multiple Trauma within 1 month                        (5 Points)  [ ] Lupus anticoagulants                                     (3 Points)                                                           [ ] Anticardiolipin antibodies                               (3 Points)                                                       [ ] High homocysteine in the blood                      (3 Points)                                             [ ] Other congenital or acquired thrombophilia      (3 Points)                                                [ ] Heparin induced thrombocytopenia                  (3 Points)                                     Total Score [     2     ]  OPIOID RISK TOOL    FRANCISCA EACH BOX THAT APPLIES AND ADD TOTALS AT THE END    FAMILY HISTORY OF SUBSTANCE ABUSE                 FEMALE         MALE                                                Alcohol                             [  ]1 pt          [  ]3pts                                               Illegal Durgs                     [  ]2 pts        [  ]3pts                                               Rx Drugs                           [  ]4 pts        [  ]4 pts    PERSONAL HISTORY OF SUBSTANCE ABUSE                                                                                          Alcohol                             [  ]3 pts       [  ]3 pts                                               Illegal Drugs                     [  ]4 pts        [  ]4 pts                                               Rx Drugs                           [  ]5 pts        [  ]5 pts    AGE BETWEEN 16-45 YEARS                                      [  ]1 pt         [  ]1 pt    HISTORY OF PREADOLESCENT   SEXUAL ABUSE                                                             [  ]3 pts        [  ]0pts    PSYCHOLOGICAL DISEASE                     ADD, OCD, Bipolar, Schizophrenia        [  ]2 pts         [  ]2 pts                      Depression                                               [  ]1 pt           [  ]1 pt           SCORING TOTAL   (add numbers and type here)              (**0*)                                     A score of 3 or lower indicated LOW risk for future opioid abuse  A score of 4 to 7 indicated moderate risk for future opioid abuse  A score of 8 or higher indicates a high risk for opioid abuse

## 2022-12-28 NOTE — H&P PST ADULT - NSICDXFAMILYHX_GEN_ALL_CORE_FT
FAMILY HISTORY:  Sibling  Still living? Yes, Estimated age: Age Unknown  FHx: breast cancer, Age at diagnosis: Age Unknown  FHx: thyroid cancer, Age at diagnosis: Age Unknown    Grandparent  Still living? No  Family hx of lung cancer, Age at diagnosis: Age Unknown

## 2022-12-28 NOTE — H&P PST ADULT - PSYCHIATRIC
normal/normal affect/alert and oriented x3/normal behavior negative details… normal affect/alert and oriented x3/normal behavior/anxious

## 2022-12-28 NOTE — H&P PST ADULT - PROBLEM SELECTOR PLAN 2
Pelvic exam under anesthesia, Robotic assisted  laparoscopic right ovary cystectomy with frozen section, possible oophorectomy, possible staging. Follow-up with PCP for medical clearance

## 2022-12-28 NOTE — H&P PST ADULT - NSICDXPASTMEDICALHX_GEN_ALL_CORE_FT
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

## 2023-01-05 NOTE — ASU PATIENT PROFILE, ADULT - WAS YOUR LAST COVID-19 VACCINE GREATER THAN OR EQUAL TO TWO MONTHS AGO?
GYN Clinic Note    Encounter Date: 2019    Subjective:     Chief Complaint:   Chief Complaint   Patient presents with   • Vaginal Problem     X 2 months          HPI:  Patient comes today for 2 months of vaginal itching.  She notes she recently had run out of her of her diabetes medications as she was switching insurances.  Admits sugars have been above 200.  Was recently put on insulin by Dr. Rachel.  Had tried Diflucan a couple of times.  It worked but then after a couple of days that she will come back.       all .     Past medical history, family history and surgical history reviewed and updated in Epic    Medications:  Current Outpatient Medications   Medication Sig Dispense Refill   • fluconazole (DIFLUCAN) 150 MG tablet Take one tablet every three days for three doses. 3 tablet 0   • metFORMIN (GLUCOPHAGE-XR) 500 MG 24 hr tablet Take 2 tablets by mouth daily (with breakfast). 180 tablet 0   • Alcohol Swabs (ALCOHOL PADS) 70 % Pads Apply 1 Units topically 2 times daily. USE AS DIRECTED TWICE A  each 3   • blood glucose (ROBIN CONTOUR NEXT TEST) test strip CHECK TWICE DAILY 100 strip 3   • Lancets (MICROLET) Misc test twice a day 100 each 3   • Alogliptin-Pioglitazone 25-30 MG Tab Take 1 tablet by mouth daily. 90 tablet 3   • ibuprofen (MOTRIN) 600 MG tablet Take 1 tablet by mouth every 6 hours as needed for Pain. 30 tablet 0   • insulin glargine (LANTUS SOLOSTAR, BASAGLAR) 100 UNIT/ML pen-injector Inject 40 Units into the skin nightly. 15 mL 5   • clotrimazole-betamethasone (LOTRISONE) 1-0.05 % cream Apply topically 2 times daily. 30 g 0   • Insulin Pen Needle 31G X 5 MM Misc Use to inject insulin 1 times daily. Remove needle cover(s) to expose needle before injecting. 100 each 3   • ranitidine (ZANTAC) 150 MG tablet Take 1 tablet by mouth 2 times daily. 60 tablet 5   • atorvastatin (LIPITOR) 10 MG tablet Take 1 tablet by mouth daily. 90 tablet 3   • lisinopril (ZESTRIL) 20 MG tablet  Take 1 tablet by mouth daily. TAKE 1 TABLET DAILY 90 tablet 3   • fluticasone (FLONASE) 50 MCG/ACT nasal spray TWO SPRAYS IN EACH NOSTRIL ONCE DAILY       No current facility-administered medications for this visit.        Allergies:   ALLERGIES:  Penicillins    Social Hx:  reports that she has never smoked. She has never used smokeless tobacco. She reports that she does not drink alcohol or use drugs.      Objective:    PE:  Vitals:    11/25/19 1342   BP: 128/84   Pulse: 91   Resp: 16   Temp: 98.4 °F (36.9 °C)   TempSrc: Tympanic   SpO2: 98%   Weight: 122.4 kg (269 lb 13.5 oz)   PainSc:  0       MA chaperone present for exam   General: well appearing , in no acute distress  Abdomen: Soft, non-tender, non-distended, no masses  Breast: no lumps or masses. Normal axilla and supraclavular region without adenopathy. No nipple abnormality  Skin: Dry and warm  External genitalia: Mildly erythematous and edematous labia majora and minora bilateral.  Vagina: well rugated, without lesions or abnormal discharge  Cervix: Absent.  Uterus: Absent.  Adnexa: no masses or tenderness  Psych: normal affect       Assessment and Plan:    Labial irritation  Sent yeast culture to rule out Candida glabrata.  Also sent pathogen swab.  Will treat with 3 doses of Diflucan 3 days apart each in the meantime.  Labial appearance is consistent with yeast.  Reiterated Dr. Rachel advice on getting good control of blood sugars to minimize recurrence risk of yeast.    Breast exam within normal limits.  Mammogram up-to-date.              Juvenal Oneill MD    Yes

## 2023-01-06 ENCOUNTER — RESULT REVIEW (OUTPATIENT)
Age: 38
End: 2023-01-06

## 2023-01-06 ENCOUNTER — TRANSCRIPTION ENCOUNTER (OUTPATIENT)
Age: 38
End: 2023-01-06

## 2023-01-06 ENCOUNTER — OUTPATIENT (OUTPATIENT)
Dept: INPATIENT UNIT | Facility: HOSPITAL | Age: 38
LOS: 1 days | End: 2023-01-06
Payer: COMMERCIAL

## 2023-01-06 VITALS
SYSTOLIC BLOOD PRESSURE: 117 MMHG | TEMPERATURE: 98 F | DIASTOLIC BLOOD PRESSURE: 78 MMHG | RESPIRATION RATE: 16 BRPM | WEIGHT: 133.38 LBS | HEART RATE: 90 BPM | HEIGHT: 65 IN | OXYGEN SATURATION: 100 %

## 2023-01-06 VITALS
RESPIRATION RATE: 14 BRPM | TEMPERATURE: 98 F | OXYGEN SATURATION: 100 % | DIASTOLIC BLOOD PRESSURE: 60 MMHG | SYSTOLIC BLOOD PRESSURE: 113 MMHG | HEART RATE: 76 BPM

## 2023-01-06 DIAGNOSIS — Z98.82 BREAST IMPLANT STATUS: Chronic | ICD-10-CM

## 2023-01-06 DIAGNOSIS — D39.12 NEOPLASM OF UNCERTAIN BEHAVIOR OF LEFT OVARY: ICD-10-CM

## 2023-01-06 DIAGNOSIS — Z98.890 OTHER SPECIFIED POSTPROCEDURAL STATES: Chronic | ICD-10-CM

## 2023-01-06 DIAGNOSIS — Z90.721 ACQUIRED ABSENCE OF OVARIES, UNILATERAL: Chronic | ICD-10-CM

## 2023-01-06 PROCEDURE — 88305 TISSUE EXAM BY PATHOLOGIST: CPT | Mod: 26,59

## 2023-01-06 PROCEDURE — 88331 PATH CONSLTJ SURG 1 BLK 1SPC: CPT | Mod: 26

## 2023-01-06 PROCEDURE — 88305 TISSUE EXAM BY PATHOLOGIST: CPT | Mod: 26

## 2023-01-06 PROCEDURE — 88112 CYTOPATH CELL ENHANCE TECH: CPT

## 2023-01-06 PROCEDURE — S2900: CPT

## 2023-01-06 PROCEDURE — 58662 LAPAROSCOPY EXCISE LESIONS: CPT

## 2023-01-06 PROCEDURE — C9399: CPT

## 2023-01-06 PROCEDURE — 88331 PATH CONSLTJ SURG 1 BLK 1SPC: CPT

## 2023-01-06 PROCEDURE — 88112 CYTOPATH CELL ENHANCE TECH: CPT | Mod: 26

## 2023-01-06 PROCEDURE — 36415 COLL VENOUS BLD VENIPUNCTURE: CPT

## 2023-01-06 PROCEDURE — 88305 TISSUE EXAM BY PATHOLOGIST: CPT

## 2023-01-06 RX ORDER — OXYCODONE HYDROCHLORIDE 5 MG/1
1 TABLET ORAL
Qty: 6 | Refills: 0
Start: 2023-01-06 | End: 2023-01-07

## 2023-01-06 RX ORDER — CELECOXIB 200 MG/1
400 CAPSULE ORAL ONCE
Refills: 0 | Status: COMPLETED | OUTPATIENT
Start: 2023-01-06 | End: 2023-01-06

## 2023-01-06 RX ORDER — FENTANYL CITRATE 50 UG/ML
50 INJECTION INTRAVENOUS
Refills: 0 | Status: DISCONTINUED | OUTPATIENT
Start: 2023-01-06 | End: 2023-01-08

## 2023-01-06 RX ORDER — CEFAZOLIN SODIUM 1 G
1000 VIAL (EA) INJECTION ONCE
Refills: 0 | Status: DISCONTINUED | OUTPATIENT
Start: 2023-01-06 | End: 2023-01-06

## 2023-01-06 RX ORDER — SODIUM CHLORIDE 9 MG/ML
3 INJECTION INTRAMUSCULAR; INTRAVENOUS; SUBCUTANEOUS ONCE
Refills: 0 | Status: DISCONTINUED | OUTPATIENT
Start: 2023-01-06 | End: 2023-01-06

## 2023-01-06 RX ORDER — CEFAZOLIN SODIUM 1 G
1000 VIAL (EA) INJECTION ONCE
Refills: 0 | Status: DISCONTINUED | OUTPATIENT
Start: 2023-01-06 | End: 2023-01-21

## 2023-01-06 RX ORDER — ZINC OXIDE 200 MG/G
1 OINTMENT TOPICAL THREE TIMES A DAY
Refills: 0 | Status: DISCONTINUED | OUTPATIENT
Start: 2023-01-06 | End: 2023-01-21

## 2023-01-06 RX ORDER — ACETAMINOPHEN 500 MG
3 TABLET ORAL
Qty: 36 | Refills: 0
Start: 2023-01-06 | End: 2023-01-08

## 2023-01-06 RX ORDER — SODIUM CHLORIDE 9 MG/ML
1000 INJECTION, SOLUTION INTRAVENOUS
Refills: 0 | Status: DISCONTINUED | OUTPATIENT
Start: 2023-01-06 | End: 2023-01-08

## 2023-01-06 RX ORDER — HYDROMORPHONE HYDROCHLORIDE 2 MG/ML
0.5 INJECTION INTRAMUSCULAR; INTRAVENOUS; SUBCUTANEOUS
Refills: 0 | Status: DISCONTINUED | OUTPATIENT
Start: 2023-01-06 | End: 2023-01-08

## 2023-01-06 RX ORDER — ACETAMINOPHEN 500 MG
975 TABLET ORAL ONCE
Refills: 0 | Status: COMPLETED | OUTPATIENT
Start: 2023-01-06 | End: 2023-01-06

## 2023-01-06 RX ORDER — ALPRAZOLAM 0.25 MG
0 TABLET ORAL
Qty: 0 | Refills: 0 | DISCHARGE

## 2023-01-06 RX ADMIN — Medication 975 MILLIGRAM(S): at 17:06

## 2023-01-06 RX ADMIN — FENTANYL CITRATE 50 MICROGRAM(S): 50 INJECTION INTRAVENOUS at 22:00

## 2023-01-06 RX ADMIN — CELECOXIB 400 MILLIGRAM(S): 200 CAPSULE ORAL at 17:05

## 2023-01-06 NOTE — ASU DISCHARGE PLAN (ADULT/PEDIATRIC) - CARE PROVIDER_API CALL
Colleen Coffey)  Gynecologic Oncology; Obstetrics and Gynecology  [address] [place] [address]  Phone: [phone]  Fax: [phone]  Follow Up Time:

## 2023-01-06 NOTE — ASU PREOP CHECKLIST - WAS PATIENT ON BETA BLOCKER?
Progress Note - Infectious Disease   Onel Lopez 67 y o  female MRN: [id number]  Unit/Bed#: -01 Encounter: [id number]      IMPRESSION & RECOMMENDATIONS:   Impression/Recommendations:  1  Right foot cellulitis  Secondary to #2  No associated fevers  Culture from heel showed MSSA  Admission blood cultures are negative  Continues to improve on IV cefazolin      -continue IV cefazolin  Plan for 7 days total of appropriate antibiotics, through 11/9   -serial foot exams  -monitor temperatures and hemodynamics  -monitor CBC     2  Right heel ulceration  With associated blister which was the removed and sent for culture  Culture positive for MSSA  Podiatry evaluation noted with low clinical suspicion for deeper abscess or bone infection  X-ray with no evidence of osteomyelitis  There is significant necrosis which may need further excisional debridement      -antibiotic plan as above  -follow-up further recommendations by Podiatry  May require excisional debridement   -daily local wound care and dressing changes  -serial exams     3  Leukocytosis  Likely secondary to #1  No associated fevers  Hemodynamics remain stable  Blood cultures are negative  WBC count has normalized and remains stable      -antibiotic plan as above  -monitor temperatures and hemodynamics  -monitor CBC     4  Advanced dementia  Normally lives in a locked dementia unit  History is very limited         Antibiotics:  Cefazolin 4  Antibiotic 6     I discussed above plan with patient and Podiatry symptoms  Will plan to see patient again on 11/11  Please call us with any new questions in the interim  Subjective:  Patient is resting comfortably  Mild foot pain  Remains a poor historian  No fevers  No other new complaints      Objective:  Vitals:  Temp:  [98 2 °F (36 8 °C)-98 9 °F (37 2 °C)] 98 9 °F (37 2 °C)  HR:  [82-88] 88  Resp:  [16-18] 18  BP: (136-141)/(55-66) 137/59  SpO2:  [97 %-98 %] 97 %  Temp (24hrs), Av 6 °F (37 °C), Min:98 2 °F (36 8 °C), Max:98 9 °F (37 2 °C)  Current: Temperature: 98 9 °F (37 2 °C)    Physical Exam:   General:  Pleasantly confused, resting comfortably  Eyes:  Conjunctive clear with no hemorrhages or effusions  Oropharynx:  No ulcers, no lesions  Neck:  Supple, no lymphadenopathy  Lungs:  Clear to auscultation bilaterally, no accessory muscle use  Cardiac:  Regular rate and rhythm, no murmurs  Abdomen:  Soft, non-tender, non-distented  Extremities:  No peripheral cyanosis, clubbing, or edema  Skin:  No rashes, right foot dressing intact with no ascending erythema  Neurological:  Moves all four extremities spontaneously    Lab Results:  I have personally reviewed pertinent labs  Results from last 7 days   Lab Units 19  0551 19  0604 19  0541  19  1144   POTASSIUM mmol/L 4 0 4 0 4 2   < > 3 7   CHLORIDE mmol/L 110* 108 109*   < > 105   CO2 mmol/L 26 27 24   < > 27   BUN mg/dL 12 14 13   < > 17   CREATININE mg/dL 0 56* 0 57* 0 67   < > 0 60   EGFR ml/min/1 73sq m 91 90 86   < > 89   CALCIUM mg/dL 9 2 9 1 8 9   < > 9 3   AST U/L  --   --   --   --  22   ALT U/L  --   --   --   --  31   ALK PHOS U/L  --   --   --   --  120*    < > = values in this interval not displayed  Results from last 7 days   Lab Units 19  0551 19  0604 19  0541   WBC Thousand/uL 9 97 9 82 8 72   HEMOGLOBIN g/dL 11 3* 11 1* 11 3*   PLATELETS Thousands/uL 425* 483* 491*     Results from last 7 days   Lab Units 19  0959 19  1729 19  1144   BLOOD CULTURE   --   --  No Growth After 4 Days  No Growth After 4 Days     GRAM STAIN RESULT  No polys seen*  3+ Gram negative rods*  2+ Gram positive cocci in pairs*  --   --    WOUND CULTURE  4+ Growth of Staphylococcus aureus*  3+ Growth of Diphtheroids  --   --    MRSA CULTURE ONLY   --  No Methicillin Resistant Staphlyococcus aureus (MRSA) isolated  --        Imaging Studies:   I have personally reviewed pertinent imaging study reports and images in PACS  EKG, Pathology, and Other Studies:   I have personally reviewed pertinent reports  No

## 2023-01-06 NOTE — ASU DISCHARGE PLAN (ADULT/PEDIATRIC) - ASU DC SPECIAL INSTRUCTIONSFT
- Please contact the office for any pain uncontrolled by medication, excessive bleeding or Fever>100.4  - Please call the office for a follow-up with your doctor in 2 weeks  - You can take naproxen 500mg every 12 hours and tylenol 975mg every 6 hours as needed for pain.  - Oxycodone should be used for severe pain only  - You may walk and climb stairs as often as youd like, no vigorous activity, do not lift anything greater than 10lbs, nothing per vagina x 6 weeks, do not drive while on pain medication.

## 2023-01-06 NOTE — BRIEF OPERATIVE NOTE - OPERATION/FINDINGS
R ovarian cyst present and removed with intact right ovary. L ovary not present consistent with surgical history. Two left peritoneal inclusions cysts noted and excised. No peritoneal or liver lesions noted. Gallbladder visualized, grossly normal.

## 2023-01-06 NOTE — BRIEF OPERATIVE NOTE - NSICDXBRIEFPROCEDURE_GEN_ALL_CORE_FT
PROCEDURES:  Robot-assisted laparoscopic right ovarian cystectomy 06-Jan-2023 20:30:58  Del Angelita Lui  Excision, inclusion cyst 06-Jan-2023 20:31:26  Del Angelita Lui

## 2023-01-06 NOTE — CHART NOTE - NSCHARTNOTEFT_GEN_A_CORE
GYNECOLOGIC ONCOLOGY PROGRESS NOTE    POD#0    SUBJECTIVE:  Patient is without complaints.  Pain well-controlled.  Flatus: Denies  Denies Nausea, Vomiting or Diarrhea.  Denies shortness of breath, chest pain or dyspnea on exertion.  Tolerating diet.    OBJECTIVE:     VITALS:  T(F): 99.2 (01-06-23 @ 21:00), Max: 99.2 (01-06-23 @ 21:00)  HR: 84 (01-06-23 @ 22:15) (75 - 90)  BP: 107/61 (01-06-23 @ 22:15) (107/61 - 129/88)  RR: 14 (01-06-23 @ 22:15) (10 - 16)  SpO2: 98% (01-06-23 @ 22:15) (98% - 100%)    I&O's Summary    MEDICATIONS  (STANDING):  ceFAZolin  Injectable. 1000 milliGRAM(s) IV Push once  lactated ringers. 1000 milliLiter(s) (125 mL/Hr) IV Continuous <Continuous>  zinc oxide 20% Ointment 1 Application(s) Topical three times a day    MEDICATIONS  (PRN):  fentaNYL    Injectable 50 MICROGram(s) IV Push every 30 minutes PRN Severe Pain (7 - 10)  HYDROmorphone  Injectable 0.5 milliGRAM(s) IV Push every 2 hours PRN Moderate Pain (4 - 6)      Physical Exam:  Constitutional: NAD  Pulmonary: clear to auscultation bilaterally   Cardiovascular: Regular rate and rhythm   Abdomen: soft, non-tender, non-distended, normal bowel sounds  Extremities: no lower extremity edema or calve tenderness, Nakul's sign negative.  Incision: Clean, dry, intact.  Without signs of infection or hernia.  Pelvic: no vaginal bleeding       A/P: Patient is a 36yo POD#0 s/p pelvic EUA, RA laparoscopic right ovarian cystectomy and removal of peritoneal inclusion cysts; uncomplicated.     Neuro: Pain well controlled. Continue current pain regimen.  CV: No history of cardiovascular disease. Blood pressure well controlled (107/61 - 129/88).  Pulm: No active disease. Saturating well on room air.   GI: No active disease. Bowel sounds normal, tolerating PO diet.   : Martinez removed, voiding spontaneously.  Heme: No concerns at this time   ID: Afebrile. No antibiotics indicated at this time.   Endo: No active disease.   FEN: No concerns at this time   Skin: No active disease.   Psych: No active disease.   DVT ppx: Ambulation encouraged, SCDs when in bed  Dispo: Stable for discharge to home

## 2023-01-11 LAB — NON-GYNECOLOGICAL CYTOLOGY STUDY: SIGNIFICANT CHANGE UP

## 2023-01-13 ENCOUNTER — NON-APPOINTMENT (OUTPATIENT)
Age: 38
End: 2023-01-13

## 2023-01-17 PROBLEM — D39.12 OVARIAN TUMOR OF BORDERLINE MALIGNANCY, LEFT: Status: ACTIVE | Noted: 2021-01-12

## 2023-01-17 LAB — SURGICAL PATHOLOGY STUDY: SIGNIFICANT CHANGE UP

## 2023-01-18 ENCOUNTER — NON-APPOINTMENT (OUTPATIENT)
Age: 38
End: 2023-01-18

## 2023-01-18 ENCOUNTER — APPOINTMENT (OUTPATIENT)
Dept: GYNECOLOGIC ONCOLOGY | Facility: CLINIC | Age: 38
End: 2023-01-18
Payer: COMMERCIAL

## 2023-01-18 DIAGNOSIS — D39.12 NEOPLASM OF UNCERTAIN BEHAVIOR OF LEFT OVARY: ICD-10-CM

## 2023-01-18 PROBLEM — F41.9 ANXIETY DISORDER, UNSPECIFIED: Chronic | Status: ACTIVE | Noted: 2022-12-28

## 2023-01-20 ENCOUNTER — APPOINTMENT (OUTPATIENT)
Dept: GYNECOLOGIC ONCOLOGY | Facility: CLINIC | Age: 38
End: 2023-01-20
Payer: COMMERCIAL

## 2023-01-20 VITALS
SYSTOLIC BLOOD PRESSURE: 103 MMHG | TEMPERATURE: 98.2 F | DIASTOLIC BLOOD PRESSURE: 69 MMHG | OXYGEN SATURATION: 100 % | HEART RATE: 59 BPM

## 2023-01-20 DIAGNOSIS — T81.40XA INFECTION FOLLOWING A PROCEDURE, UNSPECIFIED, INITIAL ENCOUNTER: ICD-10-CM

## 2023-01-20 PROCEDURE — 99024 POSTOP FOLLOW-UP VISIT: CPT

## 2023-01-20 RX ORDER — METRONIDAZOLE 7.5 MG/G
0.75 GEL VAGINAL
Qty: 1 | Refills: 0 | Status: DISCONTINUED | COMMUNITY
Start: 2022-09-13 | End: 2023-01-20

## 2023-01-20 RX ORDER — METRONIDAZOLE 500 MG/1
500 TABLET ORAL
Qty: 14 | Refills: 0 | Status: DISCONTINUED | COMMUNITY
Start: 2022-08-22 | End: 2023-01-20

## 2023-01-20 RX ORDER — FLUCONAZOLE 100 MG/1
100 TABLET ORAL
Qty: 2 | Refills: 0 | Status: DISCONTINUED | COMMUNITY
Start: 2022-08-22 | End: 2023-01-20

## 2023-01-20 NOTE — DISCUSSION/SUMMARY
[Excellent Pain Control] : has excellent pain control [0] : 0 [Erythema] : was not erythematous [Ecchymosis] : was not ecchymotic [Seroma] : had no seroma [Firm] : soft [Tender] : nontender [Rebound] : no rebound tenderness [Guarding] : no guarding [Incisional Hernia] : no incisional hernia [Mass] : no palpable mass [de-identified] : Remove steristrips [FreeTextEntry1] : OPERATIVE FINDINGS: R ovarian cyst present and removed with intact right ovary. L ovary not present consistent with surgical history. Two left peritoneal inclusions cysts noted and excised. No peritoneal or liver lesions noted. Gallbladder visualized, grossly normal.\par \par PATHOLOGY:\par 1. Cyst, right ovary, right cystectomy:\par -   Serous borderline tumor, see synoptic report\par \par 2. Left pelvic cyst, left cystectomy:\par -  Serous papillary cystadenoma with focal atypia

## 2023-01-20 NOTE — ASSESSMENT
[FreeTextEntry1] : The patient is healing well without complication. We discussed ongoing recuperation and reviewed final pathology results in detail - a copy was provided to the patient. We discussed signs and symptoms of cancer recurrence and reviewed recommended surveillance plan. She will have repeat pelvic ultrasound in 6 months. The patient stated and expressed a good understanding of the above information, all of her questions were answered to her apparent satisfaction.

## 2023-01-20 NOTE — REASON FOR VISIT
[Post Op] : post op visit [de-identified] : 01/06/2023 [de-identified] : Pelvic EUA, robot-assisted laparoscopic right ovarian cystectomy, Excision of inclusion cyst for R ovarian cyst at [facility] [de-identified] : The patient is healing remarkably well. She denies a change in appetite, or a change in weight. She is tolerating PO without nausea or vomiting. She denies VB/VD. She denies CP/SOB. She is requesting to return to work on 1/26/23 to perform light duties only.

## 2023-01-31 ENCOUNTER — NON-APPOINTMENT (OUTPATIENT)
Age: 38
End: 2023-01-31

## 2023-06-12 ENCOUNTER — APPOINTMENT (OUTPATIENT)
Dept: ULTRASOUND IMAGING | Facility: CLINIC | Age: 38
End: 2023-06-12
Payer: COMMERCIAL

## 2023-06-12 PROCEDURE — 76830 TRANSVAGINAL US NON-OB: CPT

## 2023-06-12 PROCEDURE — 76856 US EXAM PELVIC COMPLETE: CPT

## 2023-07-18 NOTE — HISTORY OF PRESENT ILLNESS
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 Pelvic US done on 11/15/22 to r/o recurrent borderline tumor revealed growth in the known cyst within the R ovary. MRI A/P & ALEJANDRO subsequently ordered for additional evaluation given complex appearance when compared to prior US. I was reassured by normal ALEJANDRO result although due to imaging characteristic of R ovarian cyst, we discussed my concern for recurrent borderline tumor provided that MRI shows larger ovaries & enhancing mural nodules. I recommended LSC R ovarian cystectomy, possible oophorectomy and consideration of DANETTE consult prior to oocyte cryopreservation - Dr. Ai Rene information was provided to the pt. Patient has decided to discontinue oocyte preservation provided that she is  & wasvnot inclined to proceed with another procedure. \par \par She returned on 12/14/22 and  patient was interested in preserving her ovaries as she was not ready to endure surgically induced menopause. Based on her imaging showing cystic appearance & only partial involvement of the ovary. I did not find it unreasonable to retian the right ovary although I was inclined to believe her final path would show recurrent borderline tumor in the right ovary. 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 She was advised to have a follow up ultrasound in 6 months. She has a telehealth visit today to discuss results. \par \par Pelv/transvaginal US 6/12/23:IMPRESSION:\par Fibroid uterus.\par Normal endometrial lining.\par Right ovary contains follicles as well as one appears to be a crenated cyst. Because of patient's history close follow-up exam is recommended in 2-3 months time.\par Status post left oophorectomy

## 2023-07-20 ENCOUNTER — NON-APPOINTMENT (OUTPATIENT)
Age: 38
End: 2023-07-20

## 2023-07-20 ENCOUNTER — APPOINTMENT (OUTPATIENT)
Dept: GYNECOLOGIC ONCOLOGY | Facility: CLINIC | Age: 38
End: 2023-07-20

## 2023-10-18 ENCOUNTER — APPOINTMENT (OUTPATIENT)
Dept: GYNECOLOGIC ONCOLOGY | Facility: CLINIC | Age: 38
End: 2023-10-18

## 2023-10-28 NOTE — REASON FOR VISIT
[FreeTextEntry1] : [place] Location\par \par [facility] [phone] at [address] [place] [id number] \par \par hx of serous borderline tumor\par \par Annual surveillance exam 177.8

## 2023-10-30 ENCOUNTER — APPOINTMENT (OUTPATIENT)
Dept: ULTRASOUND IMAGING | Facility: CLINIC | Age: 38
End: 2023-10-30
Payer: COMMERCIAL

## 2023-10-30 PROCEDURE — 76856 US EXAM PELVIC COMPLETE: CPT

## 2023-10-30 PROCEDURE — 76830 TRANSVAGINAL US NON-OB: CPT

## 2023-11-02 ENCOUNTER — APPOINTMENT (OUTPATIENT)
Dept: GYNECOLOGIC ONCOLOGY | Facility: CLINIC | Age: 38
End: 2023-11-02

## 2023-11-02 ENCOUNTER — APPOINTMENT (OUTPATIENT)
Dept: GYNECOLOGIC ONCOLOGY | Facility: CLINIC | Age: 38
End: 2023-11-02
Payer: COMMERCIAL

## 2023-11-02 VITALS
WEIGHT: 136 LBS | SYSTOLIC BLOOD PRESSURE: 119 MMHG | BODY MASS INDEX: 22.66 KG/M2 | OXYGEN SATURATION: 100 % | HEIGHT: 65 IN | DIASTOLIC BLOOD PRESSURE: 81 MMHG | HEART RATE: 67 BPM

## 2023-11-02 PROCEDURE — 99213 OFFICE O/P EST LOW 20 MIN: CPT | Mod: 25

## 2023-11-03 LAB — HPV HIGH+LOW RISK DNA PNL CVX: NOT DETECTED

## 2023-11-08 LAB — CYTOLOGY CVX/VAG DOC THIN PREP: ABNORMAL

## 2024-04-09 NOTE — REASON FOR VISIT
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

## 2024-04-09 NOTE — END OF VISIT
[FreeTextEntry3] : Written by Amirah Christopher, acting as a scribe for Dr. Colleen Coffey. This note accurately reflects the work and decisions made by me.

## 2024-04-09 NOTE — PHYSICAL EXAM
[Chaperone Present] : A chaperone was present in the examining room during all aspects of the physical examination [FreeTextEntry2] : Amirah Christopher MA

## 2024-04-09 NOTE — PLAN
[TextEntry] : Follow up PMD as needed for ongoing medical issues Imaging as clinically indicated The risk of recurrence, signs and symptoms  and surveillance plan were reviewed in detail.  Return in 6 months or PRN if symptoms arise. All questions were answered to her apparent satisfaction.

## 2024-04-09 NOTE — ASSESSMENT
[FreeTextEntry1] : 38 year old who has a history of bilateral ovarian tumor. The patient is doing well based on today's exam, clinically SHERRI.

## 2024-04-09 NOTE — HISTORY OF PRESENT ILLNESS
[FreeTextEntry1] : 37 y/o patient with hx of borderline ovarian tumor since 2014, most recently noted with recurrent borderline tumor at the right ovary 1/2023. She also has a hx of cervical dysplasia s/p Pelvic EUA, LEEP, Cone biopsy of cervix for MAGDALENA 2-3 on 8/16/22. The patient returns for interval oncologic surveillance offering no new complaints including no abdominopelvic pain, vaginal or rectal bleeding, chest pain or shortness of breath. Normal bowel and bladder function.  Pelvic/TV US (): pending   PELV/TC US 10/30/23: IMPRESSION: Normal size uterus with small fibroid nodule. Normal endometrial lining. Right ovary contains a corpus luteal cyst with hemorrhage. A second smaller cyst with debris may represent an older corpus luteal cyst with hemorrhage.. Follow-up examination in 4-6 months is recommended.

## 2024-04-10 ENCOUNTER — APPOINTMENT (OUTPATIENT)
Dept: GYNECOLOGIC ONCOLOGY | Facility: CLINIC | Age: 39
End: 2024-04-10

## 2024-04-24 ENCOUNTER — APPOINTMENT (OUTPATIENT)
Dept: ULTRASOUND IMAGING | Facility: CLINIC | Age: 39
End: 2024-04-24
Payer: COMMERCIAL

## 2024-04-24 PROCEDURE — 76856 US EXAM PELVIC COMPLETE: CPT

## 2024-04-24 PROCEDURE — 76830 TRANSVAGINAL US NON-OB: CPT

## 2024-05-21 PROBLEM — D39.10 BORDERLINE EPITHELIAL NEOPLASM OF OVARY: Status: ACTIVE | Noted: 2023-01-20

## 2024-05-22 ENCOUNTER — APPOINTMENT (OUTPATIENT)
Dept: GYNECOLOGIC ONCOLOGY | Facility: CLINIC | Age: 39
End: 2024-05-22

## 2024-05-22 DIAGNOSIS — D39.10 NEOPLASM OF UNCERTAIN BEHAVIOR OF UNSPECIFIED OVARY: ICD-10-CM

## 2024-06-26 ENCOUNTER — APPOINTMENT (OUTPATIENT)
Dept: GYNECOLOGIC ONCOLOGY | Facility: CLINIC | Age: 39
End: 2024-06-26

## 2024-06-26 PROCEDURE — 99213 OFFICE O/P EST LOW 20 MIN: CPT

## 2024-06-26 PROCEDURE — 99459 PELVIC EXAMINATION: CPT

## 2024-06-27 NOTE — REASON FOR VISIT
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

## 2024-06-27 NOTE — END OF VISIT
[FreeTextEntry3] : Written by Anali Waters, acting as a scribe for Dr. Colleen Coffey This note accurately reflects the work and decisions made by me.

## 2024-06-27 NOTE — HISTORY OF PRESENT ILLNESS
[FreeTextEntry1] : 38 year old returns for interval oncologic surveillance offering no new complaints including no abdominopelvic pain, vaginal or rectal bleeding, chest pain or shortness of breath. Normal bowel and bladder function.  Pap smear was performed at her last appointment 11/2/23 - negative  PELV/TC US 10/30/23: IMPRESSION: Normal size uterus with small fibroid nodule. Normal endometrial lining. Right ovary contains a corpus luteal cyst with hemorrhage. A second smaller cyst with debris may represent an older corpus luteal cyst with hemorrhage.. Follow-up examination in 4-6 months is recommended.  Pelvic/TV US (4/24/24): Small uterine fibroids, not significantly changed. Small cyst with debris/clot, likely a new/different cyst than what was seen previously.  Endometrium: 9 mm. Within normal limits. Right ovary: 4.3 cm x 3.9 cm x 3.4 cm. There is a small cyst with debris/clot which measures 3.6 x 2.7 x 2.7 cm, likely a new/different cyst than what was seen previously. Left ovary: Status post left oophorectomy. No left adnexal mass is seen.

## 2024-06-27 NOTE — PLAN
[TextEntry] : Follow up PMD as needed for ongoing medical issues Imaging as clinically indicated The risk of recurrence, signs and symptoms and surveillance plan were reviewed in detail.  Return in 6 months or PRN if symptoms arise. All questions were answered to her apparent satisfaction.  Repeat US in 6 months to make sure cyst stable and improved.

## 2024-06-27 NOTE — PHYSICAL EXAM
[Chaperone Present] : A chaperone was present in the examining room during all aspects of the physical examination [17672] : A chaperone was present during the pelvic exam. [FreeTextEntry2] : Anali Waters Medical assistant chaperoned during gynecologic exam.  [Normal] : Bimanual Exam: Normal [de-identified] : palpated cyst non tender

## 2025-02-05 ENCOUNTER — APPOINTMENT (OUTPATIENT)
Dept: GYNECOLOGIC ONCOLOGY | Facility: CLINIC | Age: 40
End: 2025-02-05

## 2025-03-05 ENCOUNTER — APPOINTMENT (OUTPATIENT)
Dept: GYNECOLOGIC ONCOLOGY | Facility: CLINIC | Age: 40
End: 2025-03-05

## 2025-03-05 PROCEDURE — 99214 OFFICE O/P EST MOD 30 MIN: CPT

## 2025-03-05 PROCEDURE — G2211 COMPLEX E/M VISIT ADD ON: CPT

## 2025-04-29 ENCOUNTER — INPATIENT (INPATIENT)
Facility: HOSPITAL | Age: 40
LOS: 1 days | Discharge: HOME | DRG: 998 | End: 2025-05-01
Attending: OBSTETRICS & GYNECOLOGY | Admitting: OBSTETRICS & GYNECOLOGY
Payer: COMMERCIAL

## 2025-04-29 VITALS — RESPIRATION RATE: 18 BRPM | TEMPERATURE: 98 F | SYSTOLIC BLOOD PRESSURE: 122 MMHG | DIASTOLIC BLOOD PRESSURE: 67 MMHG

## 2025-04-29 DIAGNOSIS — Z90.721 ACQUIRED ABSENCE OF OVARIES, UNILATERAL: Chronic | ICD-10-CM

## 2025-04-29 DIAGNOSIS — Z98.890 OTHER SPECIFIED POSTPROCEDURAL STATES: Chronic | ICD-10-CM

## 2025-04-29 DIAGNOSIS — O09.529 SUPERVISION OF ELDERLY MULTIGRAVIDA, UNSPECIFIED TRIMESTER: ICD-10-CM

## 2025-04-29 DIAGNOSIS — Z98.82 BREAST IMPLANT STATUS: Chronic | ICD-10-CM

## 2025-04-29 RX ORDER — OXYTOCIN-SODIUM CHLORIDE 0.9% IV SOLN 30 UNIT/500ML 30-0.9/5 UT/ML-%
167 SOLUTION INTRAVENOUS
Qty: 30 | Refills: 0 | Status: COMPLETED | OUTPATIENT
Start: 2025-04-29 | End: 2025-04-30

## 2025-04-29 RX ORDER — SODIUM CHLORIDE 9 G/1000ML
1000 INJECTION, SOLUTION INTRAVENOUS
Refills: 0 | Status: DISCONTINUED | OUTPATIENT
Start: 2025-04-29 | End: 2025-04-30

## 2025-04-29 RX ORDER — CITRIC ACID/SODIUM CITRATE 300-500 MG
15 SOLUTION, ORAL ORAL EVERY 6 HOURS
Refills: 0 | Status: DISCONTINUED | OUTPATIENT
Start: 2025-04-29 | End: 2025-04-30

## 2025-04-29 RX ORDER — AMPICILLIN SODIUM 1 G/1
2 INJECTION, POWDER, FOR SOLUTION INTRAMUSCULAR; INTRAVENOUS ONCE
Refills: 0 | Status: COMPLETED | OUTPATIENT
Start: 2025-04-29 | End: 2025-04-29

## 2025-04-29 RX ORDER — AMPICILLIN SODIUM 1 G/1
1 INJECTION, POWDER, FOR SOLUTION INTRAMUSCULAR; INTRAVENOUS EVERY 4 HOURS
Refills: 0 | Status: DISCONTINUED | OUTPATIENT
Start: 2025-04-29 | End: 2025-04-30

## 2025-04-29 RX ADMIN — SODIUM CHLORIDE 125 MILLILITER(S): 9 INJECTION, SOLUTION INTRAVENOUS at 23:27

## 2025-04-29 NOTE — OB PROVIDER H&P - ASSESSMENT
A/P: Pt is a _yo G_P_ who presents [active labor/SROM/induction of labor].  - prenatal issues, GBS status    1. Admit to LND. Routine Labs. IVF  2. Expectant Management vs. IOL  3. Fetus: Cat X tracing, Vertex, EFW _ . C/w EFM.  4+ List all prenatal issues w/ a plan (PEC, GDM, TOLAC, Asthma, etc)  5. GBS status + plan  6. Pain: IV pain meds/epidural PRN           40 yo  @39 weeks admitted for IOL 2.2 AMA.    A/P  - Admit to L&D  - Routine labs   - EFM/TOCO: Resuscitative measures PRN  - Clear liquid diet  - IVH  - Anesthesia consult   - Bicitra  - GBS positive, ampicillin for GBS prophylaxis  - Epi PRN  - PO cytotec followed by pitocin for IOL  - Expect     Plan per Dr. Kan Colon PA-C

## 2025-04-29 NOTE — OB PROVIDER H&P - NSHPPHYSICALEXAM_GEN_ALL_CORE
Vital Signs Last 24 Hrs  T(C): --  T(F): --  HR: 77 (29 Apr 2025 23:30) (77 - 95)  BP: 122/67 (29 Apr 2025 22:44) (122/67 - 122/67)  BP(mean): --  RR: --  SpO2: 98% (29 Apr 2025 23:30) (97% - 99%)        Physical Exam:  Gen: NAD, AOx3  CV: RR  Resp: unlabored respirations  Abd: soft, NT, ND    Speculum Exam:   - pooling, nitrazine, ferning, bleeding   - (lesions if pt has history of HSV)    SVE:  FHT:  [place]:  EFW: Sono/leopolds  Sono: fetal presentation, placentation  BPP: PE:  T(C): 36.7 (04-30-25 @ 00:51), Max: 36.9 (04-29-25 @ 22:44)  HR: 77 (04-30-25 @ 01:02) (71 - 95)  BP: 122/67 (04-29-25 @ 22:44) (122/67 - 122/67)  RR: 18 (04-30-25 @ 00:51) (18 - 18)  SpO2: 99% (04-30-25 @ 01:02) (97% - 100%)    General: NAD, A&Ox3  CV: Extremities well perfused on visual inspection  Lungs: Nonlabored breathing on RA  Abd: soft, nontender, gravid  VE: 2 / 60 / -3  BSUS: Vertex  EFM: 125/moderate variablity/+accels/-decels  TOCO: Occassional

## 2025-04-29 NOTE — OB PROVIDER H&P - NS_OBGYNHISTORY_OBGYN_ALL_OB_FT
OBHx: 2016 FT  uncomplicated 7lbs 10oz, Male             TOP with D&C  GYNHx:  Right ovarian tumor sp excision/oophrectemy, "borderline cancerous".  Recurrence of tumor and left ovarian cystectomy. Patient reports possible fibroids seen on sonography after birth of first child, however none discussed this pregnancy. Leep x3 last .  No hx of STDs. OBHx: 2016 FT  uncomplicated 7lbs 10oz, Male             TOP with D&C  GYNHx: 2014 Left "borderline ovarian tumor" sp excision/oophorectomy. (Patient endorses right sided however upon chart review left sided).  Recurrence of "tumor" and right ovarian cystectomy. Patient reports possible fibroids seen on sonography after birth of first child, however none discussed this pregnancy. Leep x3 last .  No hx of STDs.

## 2025-04-29 NOTE — OB PROVIDER H&P - NSLOWPPHRISK_OBGYN_A_OB
No previous uterine incision/[place] Pregnancy/Less than or equal to 4 previous vaginal births/No known bleeding disorder/No history of postpartum hemorrhage/No other PPH risks indicated

## 2025-04-29 NOTE — OB PROVIDER H&P - HISTORY OF PRESENT ILLNESS
40 yo  @39w0d reports to L&D for scheduled IOL AMA. Patient reports she was last found to be 2cm in the office this week. Patient otherwise feels well, denies headache, visual changes, epigastric pain, CP, SOB, dysuria, hematuria. +FM, +CTX, -LOF, -VB.    PNC Uncomplicated   GBS positive  EFW: 7lbs 10oz per sono this week, 3500g     OBHx:  FT  uncomplicated 7lbs 10oz, Male             TOP with D&C  GYNHx: Right ovarian No fibroids, hx of abnormal pap or STDs  PMHx: No hx of DM, HTN, asthma, bleeding or clotting disorders or blood transfusions.  PSurgHx: None  Allergies: NKDA  Meds: PNV q d               bASA 81mg q d  Social: No smoking, alcohol, or illicit drug use during pregnancy   Psych: No hx of anxiety or depression     38 yo  @39w0d reports to L&D for scheduled IOL AMA. Patient reports she was last found to be 2cm in the office this week. Patient otherwise feels well, denies headache, visual changes, epigastric pain, CP, SOB, dysuria, hematuria. +FM, +CTX, -LOF, -VB.    PNC Uncomplicated   GBS positive  EFW: 7lbs 10oz per sono this week, 3500g     OBHx: 2016 FT  uncomplicated 7lbs 10oz, Male             TOP with D&C  GYNHx:  Right ovarian tumor sp excision/oophrectemy, "borderline cancerous".  Recurrence of tumor and left ovarian cystectomy. Patient reports possible fibroids seen on sonography after birth of first child, however none discussed this pregnancy. Leep x3 last .  No hx of STDs.  PMHx: No hx of DM, HTN, asthma, bleeding or clotting disorders or blood transfusions.  PSurgHx:  Removal of R ovary and tumor;  Ovarian Cystectomy; 2019 Breast Implants  Allergies: NKDA, Bacitracin Topical Ointment: Hives  Meds: PNV q d            bASA 81mg q d            Wellbutrin 150 mg q d   Social: No smoking, alcohol, or illicit drug use during pregnancy   Psych: Anxiety and Depression on medications prescribed by primary care  Patient follows with outpatient therapy. 40 yo  @39w0d reports to L&D for scheduled IOL AMA. Patient reports she was last found to be 2cm in the office this week. Patient otherwise feels well, denies headache, visual changes, epigastric pain, CP, SOB, dysuria, hematuria. +FM, +CTX, -LOF, -VB.    PNC Uncomplicated   GBS positive  EFW: 7lbs 10oz per sono this week, 3500g     OBHx: 2016 FT  uncomplicated 7lbs 10oz, Male             TOP with D&C  GYNHx:  Left "borderline ovarian tumor" sp excision/oophorectomy. (Patient endorses right sided however upon chart review left sided).  Recurrence of "tumor" and right ovarian cystectomy. Patient reports possible fibroids seen on sonography after birth of first child, however none discussed this pregnancy. Leep x3 last .  No hx of STDs.  PMHx: No hx of DM, HTN, asthma, bleeding or clotting disorders or blood transfusions.  PSurgHx:  Tumor Excision with oophorectomy;  Ovarian Cystectomy; 2019 Breast Implants  Allergies: NKDA, Bacitracin Topical Ointment: Hives  Meds: PNV q d            bASA 81mg q d            Wellbutrin 150 mg q d   Social: No smoking, alcohol, or illicit drug use during pregnancy   Psych: Anxiety and Depression on medications prescribed by primary care DRVelvet Patient follows with outpatient therapy.

## 2025-04-30 LAB
BASOPHILS # BLD AUTO: 0.02 K/UL — SIGNIFICANT CHANGE UP (ref 0–0.2)
BASOPHILS NFR BLD AUTO: 0.2 % — SIGNIFICANT CHANGE UP (ref 0–2)
BLD GP AB SCN SERPL QL: NEGATIVE — SIGNIFICANT CHANGE UP
EOSINOPHIL # BLD AUTO: 0.17 K/UL — SIGNIFICANT CHANGE UP (ref 0–0.5)
EOSINOPHIL NFR BLD AUTO: 1.9 % — SIGNIFICANT CHANGE UP (ref 0–6)
HCT VFR BLD CALC: 36.3 % — SIGNIFICANT CHANGE UP (ref 34.5–45)
HGB BLD-MCNC: 11.9 G/DL — SIGNIFICANT CHANGE UP (ref 11.5–15.5)
HIV 1 & 2 AB SERPL IA.RAPID: SIGNIFICANT CHANGE UP
IMM GRANULOCYTES NFR BLD AUTO: 0.8 % — SIGNIFICANT CHANGE UP (ref 0–0.9)
LYMPHOCYTES # BLD AUTO: 1.44 K/UL — SIGNIFICANT CHANGE UP (ref 1–3.3)
LYMPHOCYTES # BLD AUTO: 16.3 % — SIGNIFICANT CHANGE UP (ref 13–44)
MCHC RBC-ENTMCNC: 30.1 PG — SIGNIFICANT CHANGE UP (ref 27–34)
MCHC RBC-ENTMCNC: 32.8 G/DL — SIGNIFICANT CHANGE UP (ref 32–36)
MCV RBC AUTO: 91.7 FL — SIGNIFICANT CHANGE UP (ref 80–100)
MONOCYTES # BLD AUTO: 0.77 K/UL — SIGNIFICANT CHANGE UP (ref 0–0.9)
MONOCYTES NFR BLD AUTO: 8.7 % — SIGNIFICANT CHANGE UP (ref 2–14)
NEUTROPHILS # BLD AUTO: 6.35 K/UL — SIGNIFICANT CHANGE UP (ref 1.8–7.4)
NEUTROPHILS NFR BLD AUTO: 72.1 % — SIGNIFICANT CHANGE UP (ref 43–77)
NRBC BLD AUTO-RTO: 0 /100 WBCS — SIGNIFICANT CHANGE UP (ref 0–0)
PLATELET # BLD AUTO: 155 K/UL — SIGNIFICANT CHANGE UP (ref 150–400)
RBC # BLD: 3.96 M/UL — SIGNIFICANT CHANGE UP (ref 3.8–5.2)
RBC # FLD: 13.5 % — SIGNIFICANT CHANGE UP (ref 10.3–14.5)
RH IG SCN BLD-IMP: POSITIVE — SIGNIFICANT CHANGE UP
T PALLIDUM AB TITR SER: NEGATIVE — SIGNIFICANT CHANGE UP
WBC # BLD: 8.82 K/UL — SIGNIFICANT CHANGE UP (ref 3.8–10.5)
WBC # FLD AUTO: 8.82 K/UL — SIGNIFICANT CHANGE UP (ref 3.8–10.5)

## 2025-04-30 RX ORDER — OXYCODONE HYDROCHLORIDE 30 MG/1
5 TABLET ORAL ONCE
Refills: 0 | Status: DISCONTINUED | OUTPATIENT
Start: 2025-04-30 | End: 2025-05-01

## 2025-04-30 RX ORDER — MODIFIED LANOLIN 100 %
1 CREAM (GRAM) TOPICAL EVERY 6 HOURS
Refills: 0 | Status: DISCONTINUED | OUTPATIENT
Start: 2025-04-30 | End: 2025-05-01

## 2025-04-30 RX ORDER — MAGNESIUM HYDROXIDE 400 MG/5ML
30 SUSPENSION ORAL
Refills: 0 | Status: DISCONTINUED | OUTPATIENT
Start: 2025-04-30 | End: 2025-05-01

## 2025-04-30 RX ORDER — DIBUCAINE 10 MG/G
1 OINTMENT TOPICAL EVERY 6 HOURS
Refills: 0 | Status: DISCONTINUED | OUTPATIENT
Start: 2025-04-30 | End: 2025-05-01

## 2025-04-30 RX ORDER — OXYTOCIN-SODIUM CHLORIDE 0.9% IV SOLN 30 UNIT/500ML 30-0.9/5 UT/ML-%
4 SOLUTION INTRAVENOUS
Qty: 30 | Refills: 0 | Status: DISCONTINUED | OUTPATIENT
Start: 2025-04-30 | End: 2025-04-30

## 2025-04-30 RX ORDER — BENZOCAINE 220 MG/G
1 SPRAY, METERED PERIODONTAL EVERY 6 HOURS
Refills: 0 | Status: DISCONTINUED | OUTPATIENT
Start: 2025-04-30 | End: 2025-05-01

## 2025-04-30 RX ORDER — IBUPROFEN 200 MG
600 TABLET ORAL EVERY 6 HOURS
Refills: 0 | Status: DISCONTINUED | OUTPATIENT
Start: 2025-04-30 | End: 2025-05-01

## 2025-04-30 RX ORDER — KETOROLAC TROMETHAMINE 30 MG/ML
30 INJECTION, SOLUTION INTRAMUSCULAR; INTRAVENOUS ONCE
Refills: 0 | Status: DISCONTINUED | OUTPATIENT
Start: 2025-04-30 | End: 2025-04-30

## 2025-04-30 RX ORDER — WITCH HAZEL LEAF
1 FLUID EXTRACT MISCELLANEOUS EVERY 4 HOURS
Refills: 0 | Status: DISCONTINUED | OUTPATIENT
Start: 2025-04-30 | End: 2025-05-01

## 2025-04-30 RX ORDER — OXYCODONE HYDROCHLORIDE 30 MG/1
5 TABLET ORAL
Refills: 0 | Status: DISCONTINUED | OUTPATIENT
Start: 2025-04-30 | End: 2025-05-01

## 2025-04-30 RX ORDER — HYDROCORTISONE 10 MG/G
1 CREAM TOPICAL EVERY 6 HOURS
Refills: 0 | Status: DISCONTINUED | OUTPATIENT
Start: 2025-04-30 | End: 2025-05-01

## 2025-04-30 RX ORDER — IBUPROFEN 200 MG
600 TABLET ORAL EVERY 6 HOURS
Refills: 0 | Status: COMPLETED | OUTPATIENT
Start: 2025-04-30 | End: 2026-03-29

## 2025-04-30 RX ORDER — BUPROPION HYDROBROMIDE 522 MG/1
150 TABLET, EXTENDED RELEASE ORAL DAILY
Refills: 0 | Status: DISCONTINUED | OUTPATIENT
Start: 2025-04-30 | End: 2025-04-30

## 2025-04-30 RX ORDER — CLOSTRIDIUM TETANI TOXOID ANTIGEN (FORMALDEHYDE INACTIVATED), CORYNEBACTERIUM DIPHTHERIAE TOXOID ANTIGEN (FORMALDEHYDE INACTIVATED), BORDETELLA PERTUSSIS TOXOID ANTIGEN (GLUTARALDEHYDE INACTIVATED), BORDETELLA PERTUSSIS FILAMENTOUS HEMAGGLUTININ ANTIGEN (FORMALDEHYDE INACTIVATED), BORDETELLA PERTUSSIS PERTACTIN ANTIGEN, AND BORDETELLA PERTUSSIS FIMBRIAE 2/3 ANTIGEN 5; 2; 2.5; 5; 3; 5 [LF]/.5ML; [LF]/.5ML; UG/.5ML; UG/.5ML; UG/.5ML; UG/.5ML
0.5 INJECTION, SUSPENSION INTRAMUSCULAR ONCE
Refills: 0 | Status: DISCONTINUED | OUTPATIENT
Start: 2025-04-30 | End: 2025-05-01

## 2025-04-30 RX ORDER — PRENATAL 136/IRON/FOLIC ACID 27 MG-1 MG
1 TABLET ORAL DAILY
Refills: 0 | Status: DISCONTINUED | OUTPATIENT
Start: 2025-04-30 | End: 2025-05-01

## 2025-04-30 RX ORDER — ACETAMINOPHEN 500 MG/5ML
975 LIQUID (ML) ORAL
Refills: 0 | Status: DISCONTINUED | OUTPATIENT
Start: 2025-04-30 | End: 2025-05-01

## 2025-04-30 RX ORDER — OXYTOCIN-SODIUM CHLORIDE 0.9% IV SOLN 30 UNIT/500ML 30-0.9/5 UT/ML-%
167 SOLUTION INTRAVENOUS
Qty: 30 | Refills: 0 | Status: DISCONTINUED | OUTPATIENT
Start: 2025-04-30 | End: 2025-05-01

## 2025-04-30 RX ORDER — SIMETHICONE 80 MG
80 TABLET,CHEWABLE ORAL EVERY 4 HOURS
Refills: 0 | Status: DISCONTINUED | OUTPATIENT
Start: 2025-04-30 | End: 2025-05-01

## 2025-04-30 RX ORDER — PRAMOXINE HCL 1 %
1 GEL (GRAM) TOPICAL EVERY 4 HOURS
Refills: 0 | Status: DISCONTINUED | OUTPATIENT
Start: 2025-04-30 | End: 2025-05-01

## 2025-04-30 RX ORDER — BUPROPION HYDROBROMIDE 522 MG/1
150 TABLET, EXTENDED RELEASE ORAL DAILY
Refills: 0 | Status: DISCONTINUED | OUTPATIENT
Start: 2025-04-30 | End: 2025-05-01

## 2025-04-30 RX ORDER — DIPHENHYDRAMINE HCL 12.5MG/5ML
25 ELIXIR ORAL EVERY 6 HOURS
Refills: 0 | Status: DISCONTINUED | OUTPATIENT
Start: 2025-04-30 | End: 2025-05-01

## 2025-04-30 RX ADMIN — SODIUM CHLORIDE 125 MILLILITER(S): 9 INJECTION, SOLUTION INTRAVENOUS at 00:14

## 2025-04-30 RX ADMIN — Medication 975 MILLIGRAM(S): at 18:45

## 2025-04-30 RX ADMIN — Medication 600 MILLIGRAM(S): at 21:02

## 2025-04-30 RX ADMIN — AMPICILLIN SODIUM 100 GRAM(S): 1 INJECTION, POWDER, FOR SOLUTION INTRAMUSCULAR; INTRAVENOUS at 08:33

## 2025-04-30 RX ADMIN — Medication 0.2 MILLIGRAM(S): at 12:39

## 2025-04-30 RX ADMIN — BUPROPION HYDROBROMIDE 150 MILLIGRAM(S): 522 TABLET, EXTENDED RELEASE ORAL at 09:03

## 2025-04-30 RX ADMIN — OXYTOCIN-SODIUM CHLORIDE 0.9% IV SOLN 30 UNIT/500ML 4 MILLIUNIT(S)/MIN: 30-0.9/5 SOLUTION at 05:33

## 2025-04-30 RX ADMIN — OXYTOCIN-SODIUM CHLORIDE 0.9% IV SOLN 30 UNIT/500ML 167 MILLIUNIT(S)/MIN: 30-0.9/5 SOLUTION at 12:30

## 2025-04-30 RX ADMIN — Medication 600 MILLIGRAM(S): at 20:32

## 2025-04-30 RX ADMIN — KETOROLAC TROMETHAMINE 30 MILLIGRAM(S): 30 INJECTION, SOLUTION INTRAMUSCULAR; INTRAVENOUS at 13:46

## 2025-04-30 RX ADMIN — AMPICILLIN SODIUM 200 GRAM(S): 1 INJECTION, POWDER, FOR SOLUTION INTRAMUSCULAR; INTRAVENOUS at 00:14

## 2025-04-30 RX ADMIN — AMPICILLIN SODIUM 100 GRAM(S): 1 INJECTION, POWDER, FOR SOLUTION INTRAMUSCULAR; INTRAVENOUS at 04:25

## 2025-04-30 RX ADMIN — Medication 975 MILLIGRAM(S): at 18:15

## 2025-04-30 NOTE — OB PROVIDER LABOR PROGRESS NOTE - ASSESSMENT
cont current management  peanut ball    d/w Dr. Elizabeth.  GREGORY Myers
38 yo p1 here for ama induction, hx anxiety,borderline ovarian ca  now 4.5 cm  continue induction

## 2025-04-30 NOTE — OB RN PATIENT PROFILE - FUNCTIONAL ASSESSMENT - BASIC MOBILITY 6.
4-calculated by average/Not able to assess (calculate score using [facility] averaging method)

## 2025-04-30 NOTE — OB RN DELIVERY SUMMARY - NSSELHIDDEN_OBGYN_ALL_OB_FT
[NS_DeliveryAttending1_OBGYN_ALL_OB_FT:[id number]==],[NS_DeliveryAssist1_OBGYN_ALL_OB_FT:[id number]],[NS_DeliveryRN_OBGYN_ALL_OB_FT:[id number]]

## 2025-04-30 NOTE — OB RN DELIVERY SUMMARY - NS_SEPSISRSKCALC_OBGYN_ALL_OB_FT
Rupture of membranes must be entered above.  'Type of intrapartum antibiotics' must be entered above.   Rupture of membranes must be entered above.

## 2025-04-30 NOTE — OB PROVIDER DELIVERY SUMMARY - NSPROVIDERDELIVERYNOTE_OBGYN_ALL_OB_FT
viable female infant over intact perineum. Placenta spont intact 3 vessel cord . RT periurethral lac noted and repaired with 3.0 chromic suture . . Mom and baby recovering in delivery room

## 2025-04-30 NOTE — OB RN PATIENT PROFILE - EDUCATION ON THE POTENTIAL RISKS AND IMPACT OF EARLY USE OF PACIFIERS ON THE ESTABLISHMENT OF BREASTFEEDING
I performed a face to face evaluation of this patient and performed a full history and physical examination on the patient.  I agree with the resident's history, physical examination, and plan of the patient. Statement Selected

## 2025-04-30 NOTE — OB RN PATIENT PROFILE - FALL HARM RISK - UNIVERSAL INTERVENTIONS
Bed in lowest position, wheels locked, appropriate side rails in place/Call bell, personal items and telephone in reach/Instruct patient to call for assistance before getting out of bed or chair/Non-slip footwear when patient is out of bed/[place] to call system/Physically safe environment - no spills, clutter or unnecessary equipment/Purposeful Proactive Rounding/Room/bathroom lighting operational, light cord in reach

## 2025-04-30 NOTE — OB RN PATIENT PROFILE - NS_OBGYNHISTORY_OBGYN_ALL_OB_FT
borderline ovarian tumor - right ovary removed ();  X1 (); TOP x1 w/ D+C (); cystectomy (); multiple LEEP procedures - pt states 2 or 3

## 2025-04-30 NOTE — OB PROVIDER LABOR PROGRESS NOTE - NS_SUBJECTIVE/OBJECTIVE_OBGYN_ALL_OB_FT
ASHLEY JENKINS Note:    Pt seen and examined for progress. Pt comfortable w/ epidural. Continues to leak light mec stained fluid. No vaginal bleeding.    O:  Vital Signs Last 24 Hrs  T(C): 36.5 (30 Apr 2025 04:48), Max: 36.9 (29 Apr 2025 22:44)  T(F): 97.7 (30 Apr 2025 03:29), Max: 98.42 (29 Apr 2025 22:44)  HR: 73 (30 Apr 2025 10:25) (59 - 95)  BP: 121/69 (30 Apr 2025 08:55) (92/53 - 122/67)  RR: 18 (30 Apr 2025 04:48) (18 - 18)  SpO2: 99% (30 Apr 2025 10:25) (91% - 100%)    Parameters below as of 30 Apr 2025 00:57  Patient On (Oxygen Delivery Method): room air  gen: NAD  abd: soft, gravid  ve: 4.5/80/-2 w/ caput through cervix

## 2025-04-30 NOTE — OB PROVIDER DELIVERY SUMMARY - NSSELHIDDEN_OBGYN_ALL_OB_FT
[NS_DeliveryAttending1_OBGYN_ALL_OB_FT:[id number]],[NS_DeliveryAssist1_OBGYN_ALL_OB_FT:[id number]]

## 2025-05-01 ENCOUNTER — TRANSCRIPTION ENCOUNTER (OUTPATIENT)
Age: 40
End: 2025-05-01

## 2025-05-01 VITALS
RESPIRATION RATE: 18 BRPM | HEART RATE: 70 BPM | OXYGEN SATURATION: 96 % | DIASTOLIC BLOOD PRESSURE: 69 MMHG | SYSTOLIC BLOOD PRESSURE: 108 MMHG | TEMPERATURE: 98 F

## 2025-05-01 PROCEDURE — 86780 TREPONEMA PALLIDUM: CPT

## 2025-05-01 PROCEDURE — 86850 RBC ANTIBODY SCREEN: CPT

## 2025-05-01 PROCEDURE — 86900 BLOOD TYPING SEROLOGIC ABO: CPT

## 2025-05-01 PROCEDURE — 86703 HIV-1/HIV-2 1 RESULT ANTBDY: CPT

## 2025-05-01 PROCEDURE — 59050 FETAL MONITOR W/REPORT: CPT

## 2025-05-01 PROCEDURE — 86901 BLOOD TYPING SEROLOGIC RH(D): CPT

## 2025-05-01 PROCEDURE — 85025 COMPLETE CBC W/AUTO DIFF WBC: CPT

## 2025-05-01 RX ADMIN — Medication 975 MILLIGRAM(S): at 05:19

## 2025-05-01 RX ADMIN — Medication 600 MILLIGRAM(S): at 10:08

## 2025-05-01 RX ADMIN — Medication 975 MILLIGRAM(S): at 12:43

## 2025-05-01 RX ADMIN — BUPROPION HYDROBROMIDE 150 MILLIGRAM(S): 522 TABLET, EXTENDED RELEASE ORAL at 10:31

## 2025-05-01 RX ADMIN — Medication 600 MILLIGRAM(S): at 09:38

## 2025-05-01 RX ADMIN — Medication 975 MILLIGRAM(S): at 05:49

## 2025-05-01 RX ADMIN — Medication 1 TABLET(S): at 12:43

## 2025-05-01 RX ADMIN — Medication 975 MILLIGRAM(S): at 13:13

## 2025-05-01 NOTE — DISCHARGE NOTE OB - PATIENT PORTAL LINK FT
You can access the FollowMyHealth Patient Portal offered by [facility] by registering at the following website: [email or web address]. By joining [facility]’s FollowMyHealth portal, you will also be able to view your health information using other applications (apps) compatible with our system.

## 2025-05-01 NOTE — DISCHARGE NOTE OB - CARE PLAN
Principal Discharge DX:	Vaginal delivery  Assessment and plan of treatment:	After discharge, please stay on pelvic rest for 6 weeks, meaning no sexual intercourse, no tampons and no douching.  No driving for 2 weeks as women can loose a lot of blood during delivery and there is a possibility of being lightheaded/fainting.  No lifting objects heavier than baby for two weeks.  Expect to have vaginal bleeding/spotting for up to six weeks.  The bleeding should get lighter and more white/light brown with time.  For bleeding soaking more than a pad an hour or passing clots greater than the size of your fist, come in to the emergency department.    Follow up in the office in 6 weeks.   1

## 2025-05-01 NOTE — DISCHARGE NOTE OB - PLAN OF CARE
After discharge, please stay on pelvic rest for 6 weeks, meaning no sexual intercourse, no tampons and no douching.  No driving for 2 weeks as women can loose a lot of blood during delivery and there is a possibility of being lightheaded/fainting.  No lifting objects heavier than baby for two weeks.  Expect to have vaginal bleeding/spotting for up to six weeks.  The bleeding should get lighter and more white/light brown with time.  For bleeding soaking more than a pad an hour or passing clots greater than the size of your fist, come in to the emergency department.    Follow up in the office in 6 weeks.

## 2025-05-01 NOTE — DISCHARGE NOTE OB - CARE PROVIDER_API CALL
Ai Elizabeth  Obstetrics and Gynecology  [address]  [place] [address]  Phone: [phone]  Fax: [phone]  Follow Up Time: 1 month

## 2025-05-01 NOTE — DISCHARGE NOTE OB - DISCHARGE DATE
Detail Level: Detailed Quality 110: Preventive Care And Screening: Influenza Immunization: Influenza Immunization Administered during Influenza season 01-May-2025

## 2025-05-01 NOTE — PROGRESS NOTE ADULT - ASSESSMENT
A/P: 40yo PPD#1 s/p . EBL: 300ccs Patient is stable and doing well post-partum.   - Pain well controlled, continue current pain regimen  - Increase ambulation, SCDs when not ambulating  - Continue regular diet    Lynn Willis MD PGY1

## 2025-05-01 NOTE — DISCHARGE NOTE OB - HOSPITAL COURSE
Patient had uncomplicated, nonsurgical vaginal delivery.  Please see delivery note for details.  During postpartum course patient's vitals were stable, vaginal bleeding appropriate, and pain well controlled.  On day of discharge patient was ambulating, her pain controlled with oral medications, had adequate oral intake, and was voiding freely.  Discharge instructions and precautions were given.  Will return to the office in 6 weeks.

## 2025-05-01 NOTE — DISCHARGE NOTE OB - MEDICATION SUMMARY - MEDICATIONS TO TAKE
I will START or STAY ON the medications listed below when I get home from the hospital:    Xanax 0.25 mg oral tablet  -- orally prn  -- Indication: For anxiety

## 2025-05-01 NOTE — PROGRESS NOTE ADULT - SUBJECTIVE AND OBJECTIVE BOX
OB Progress Note:  PPD#1    S: 40yo  PPD#1 s/p . Patient feels well. Pain is well controlled. She is tolerating a regular diet and passing flatus. She is voiding spontaneously, and ambulating without difficulty. Denies CP/SOB. Denies lightheadedness/dizziness. Denies N/V.    O:  Vitals:  Vital Signs Last 24 Hrs  T(C): 36.5 (01 May 2025 05:04), Max: 37.2 (2025 11:00)  T(F): 97.7 (01 May 2025 05:04), Max: 98.96 (2025 11:00)  HR: 69 (01 May 2025 05:04) (57 - 130)  BP: 115/73 (01 May 2025 05:04) (99/55 - 155/69)  BP(mean): --  RR: 18 (01 May 2025 05:04) (16 - 18)  SpO2: 96% (01 May 2025 05:04) (84% - 100%)    Parameters below as of 01 May 2025 05:04  Patient On (Oxygen Delivery Method): room air        MEDICATIONS  (STANDING):  acetaminophen     Tablet .. 975 milliGRAM(s) Oral <User Schedule>  buPROPion XL (24-Hour) . 150 milliGRAM(s) Oral daily  diphtheria/tetanus/pertussis (acellular) Vaccine (Adacel) 0.5 milliLiter(s) IntraMuscular once  ibuprofen  Tablet. 600 milliGRAM(s) Oral every 6 hours  oxytocin Infusion 167 milliUNIT(s)/Min (167 mL/Hr) IV Continuous <Continuous>  prenatal multivitamin 1 Tablet(s) Oral daily  sodium chloride 0.9% lock flush 3 milliLiter(s) IV Push every 8 hours      Labs:  Blood type: A Positive  Rubella IgG: RPR: Negative                          11.9   8.82 >-----------< 155    (  @ 00:11 )             36.3                  Physical Exam:  General: NAD  Abdomen: soft, appropriately tender, non-distended, fundus firm  Vaginal: Lochia wnl in pad below.  Extremities: No erythema/edema

## 2025-05-01 NOTE — DISCHARGE NOTE OB - FINANCIAL ASSISTANCE
[facility] provides services at a reduced cost to those who are determined to be eligible through [facility]’s financial assistance program. Information regarding [facility]’s financial assistance program can be found by going to [email or web address] or by calling [phone].

## 2025-05-06 ENCOUNTER — EMERGENCY (EMERGENCY)
Facility: HOSPITAL | Age: 40
LOS: 1 days | End: 2025-05-06
Attending: EMERGENCY MEDICINE
Payer: COMMERCIAL

## 2025-05-06 VITALS
RESPIRATION RATE: 18 BRPM | TEMPERATURE: 98 F | DIASTOLIC BLOOD PRESSURE: 78 MMHG | SYSTOLIC BLOOD PRESSURE: 141 MMHG | OXYGEN SATURATION: 99 % | HEART RATE: 65 BPM

## 2025-05-06 VITALS
RESPIRATION RATE: 16 BRPM | DIASTOLIC BLOOD PRESSURE: 84 MMHG | OXYGEN SATURATION: 100 % | TEMPERATURE: 99 F | SYSTOLIC BLOOD PRESSURE: 130 MMHG | HEART RATE: 79 BPM | HEIGHT: 65 IN

## 2025-05-06 DIAGNOSIS — Z90.721 ACQUIRED ABSENCE OF OVARIES, UNILATERAL: Chronic | ICD-10-CM

## 2025-05-06 DIAGNOSIS — Z98.82 BREAST IMPLANT STATUS: Chronic | ICD-10-CM

## 2025-05-06 DIAGNOSIS — Z98.890 OTHER SPECIFIED POSTPROCEDURAL STATES: Chronic | ICD-10-CM

## 2025-05-06 LAB
ALBUMIN SERPL ELPH-MCNC: 4 G/DL — SIGNIFICANT CHANGE UP (ref 3.3–5)
ALP SERPL-CCNC: 147 U/L — HIGH (ref 40–120)
ALT FLD-CCNC: 122 U/L — HIGH (ref 10–45)
ANION GAP SERPL CALC-SCNC: 14 MMOL/L — SIGNIFICANT CHANGE UP (ref 5–17)
APTT BLD: 31.1 SEC — SIGNIFICANT CHANGE UP (ref 26.1–36.8)
AST SERPL-CCNC: 69 U/L — HIGH (ref 10–40)
BASOPHILS # BLD AUTO: 0.06 K/UL — SIGNIFICANT CHANGE UP (ref 0–0.2)
BASOPHILS NFR BLD AUTO: 0.5 % — SIGNIFICANT CHANGE UP (ref 0–2)
BILIRUB SERPL-MCNC: 1.1 MG/DL — SIGNIFICANT CHANGE UP (ref 0.2–1.2)
BUN SERPL-MCNC: 12 MG/DL — SIGNIFICANT CHANGE UP (ref 7–23)
CALCIUM SERPL-MCNC: 10 MG/DL — SIGNIFICANT CHANGE UP (ref 8.4–10.5)
CHLORIDE SERPL-SCNC: 103 MMOL/L — SIGNIFICANT CHANGE UP (ref 96–108)
CO2 SERPL-SCNC: 23 MMOL/L — SIGNIFICANT CHANGE UP (ref 22–31)
CREAT SERPL-MCNC: 0.9 MG/DL — SIGNIFICANT CHANGE UP (ref 0.5–1.3)
EGFR: 83 ML/MIN/1.73M2 — SIGNIFICANT CHANGE UP
EGFR: 83 ML/MIN/1.73M2 — SIGNIFICANT CHANGE UP
EOSINOPHIL # BLD AUTO: 0.16 K/UL — SIGNIFICANT CHANGE UP (ref 0–0.5)
EOSINOPHIL NFR BLD AUTO: 1.4 % — SIGNIFICANT CHANGE UP (ref 0–6)
GLUCOSE SERPL-MCNC: 96 MG/DL — SIGNIFICANT CHANGE UP (ref 70–99)
HCT VFR BLD CALC: 40.6 % — SIGNIFICANT CHANGE UP (ref 34.5–45)
HGB BLD-MCNC: 13.2 G/DL — SIGNIFICANT CHANGE UP (ref 11.5–15.5)
IMM GRANULOCYTES NFR BLD AUTO: 0.8 % — SIGNIFICANT CHANGE UP (ref 0–0.9)
INR BLD: 0.91 RATIO — SIGNIFICANT CHANGE UP (ref 0.85–1.16)
LYMPHOCYTES # BLD AUTO: 1.43 K/UL — SIGNIFICANT CHANGE UP (ref 1–3.3)
LYMPHOCYTES # BLD AUTO: 12.5 % — LOW (ref 13–44)
MCHC RBC-ENTMCNC: 30.5 PG — SIGNIFICANT CHANGE UP (ref 27–34)
MCHC RBC-ENTMCNC: 32.5 G/DL — SIGNIFICANT CHANGE UP (ref 32–36)
MCV RBC AUTO: 93.8 FL — SIGNIFICANT CHANGE UP (ref 80–100)
MONOCYTES # BLD AUTO: 0.44 K/UL — SIGNIFICANT CHANGE UP (ref 0–0.9)
MONOCYTES NFR BLD AUTO: 3.9 % — SIGNIFICANT CHANGE UP (ref 2–14)
NEUTROPHILS # BLD AUTO: 9.24 K/UL — HIGH (ref 1.8–7.4)
NEUTROPHILS NFR BLD AUTO: 80.9 % — HIGH (ref 43–77)
NRBC BLD AUTO-RTO: 0 /100 WBCS — SIGNIFICANT CHANGE UP (ref 0–0)
PLATELET # BLD AUTO: 220 K/UL — SIGNIFICANT CHANGE UP (ref 150–400)
POTASSIUM SERPL-MCNC: 4.2 MMOL/L — SIGNIFICANT CHANGE UP (ref 3.5–5.3)
POTASSIUM SERPL-SCNC: 4.2 MMOL/L — SIGNIFICANT CHANGE UP (ref 3.5–5.3)
PROT SERPL-MCNC: 7 G/DL — SIGNIFICANT CHANGE UP (ref 6–8.3)
PROTHROM AB SERPL-ACNC: 10.4 SEC — SIGNIFICANT CHANGE UP (ref 9.9–13.4)
RBC # BLD: 4.33 M/UL — SIGNIFICANT CHANGE UP (ref 3.8–5.2)
RBC # FLD: 13.5 % — SIGNIFICANT CHANGE UP (ref 10.3–14.5)
SODIUM SERPL-SCNC: 140 MMOL/L — SIGNIFICANT CHANGE UP (ref 135–145)
TROPONIN T, HIGH SENSITIVITY RESULT: 9 NG/L — SIGNIFICANT CHANGE UP (ref 0–51)
WBC # BLD: 11.42 K/UL — HIGH (ref 3.8–10.5)
WBC # FLD AUTO: 11.42 K/UL — HIGH (ref 3.8–10.5)

## 2025-05-06 PROCEDURE — 70498 CT ANGIOGRAPHY NECK: CPT | Mod: MC

## 2025-05-06 PROCEDURE — 93010 ELECTROCARDIOGRAM REPORT: CPT

## 2025-05-06 PROCEDURE — 84132 ASSAY OF SERUM POTASSIUM: CPT

## 2025-05-06 PROCEDURE — 82435 ASSAY OF BLOOD CHLORIDE: CPT

## 2025-05-06 PROCEDURE — 82803 BLOOD GASES ANY COMBINATION: CPT

## 2025-05-06 PROCEDURE — 84295 ASSAY OF SERUM SODIUM: CPT

## 2025-05-06 PROCEDURE — 83605 ASSAY OF LACTIC ACID: CPT

## 2025-05-06 PROCEDURE — 82330 ASSAY OF CALCIUM: CPT

## 2025-05-06 PROCEDURE — 85014 HEMATOCRIT: CPT

## 2025-05-06 PROCEDURE — 70498 CT ANGIOGRAPHY NECK: CPT | Mod: 26

## 2025-05-06 PROCEDURE — 85730 THROMBOPLASTIN TIME PARTIAL: CPT

## 2025-05-06 PROCEDURE — 82947 ASSAY GLUCOSE BLOOD QUANT: CPT

## 2025-05-06 PROCEDURE — 85025 COMPLETE CBC W/AUTO DIFF WBC: CPT

## 2025-05-06 PROCEDURE — 84484 ASSAY OF TROPONIN QUANT: CPT

## 2025-05-06 PROCEDURE — 85018 HEMOGLOBIN: CPT

## 2025-05-06 PROCEDURE — 93005 ELECTROCARDIOGRAM TRACING: CPT

## 2025-05-06 PROCEDURE — 82962 GLUCOSE BLOOD TEST: CPT

## 2025-05-06 PROCEDURE — 70450 CT HEAD/BRAIN W/O DYE: CPT | Mod: 26,59

## 2025-05-06 PROCEDURE — 99285 EMERGENCY DEPT VISIT HI MDM: CPT

## 2025-05-06 PROCEDURE — 99285 EMERGENCY DEPT VISIT HI MDM: CPT | Mod: 25

## 2025-05-06 PROCEDURE — 70450 CT HEAD/BRAIN W/O DYE: CPT | Mod: MC

## 2025-05-06 PROCEDURE — 70496 CT ANGIOGRAPHY HEAD: CPT | Mod: 26

## 2025-05-06 PROCEDURE — 85610 PROTHROMBIN TIME: CPT

## 2025-05-06 PROCEDURE — 70496 CT ANGIOGRAPHY HEAD: CPT | Mod: MC

## 2025-05-06 PROCEDURE — 80053 COMPREHEN METABOLIC PANEL: CPT

## 2025-05-06 PROCEDURE — 36415 COLL VENOUS BLD VENIPUNCTURE: CPT

## 2025-05-06 NOTE — CONSULT NOTE ADULT - ASSESSMENT
LKWT: 5/6/25 1300  mrS: 0  No Tenecteplase Likely peripheral etiology  no Thrombectomy: no LVO    Recommendations  -  38yo LHF with recent delivery 7 days ago reports to the hospital 2/2 right facial numbness/droop onset 1300. Neurology/code stroke for right facial droop. Patient reports some strange sensation when applying chap-stick no other focal neurological symptoms. House-Brackman Score 3,2,2. CTH/CTA unremarkable.     Impression: Mild right peripheral CN7 palsy, less concern for intracranial pathology, no signs of venous thrombosis, in absence of any provoking factor, likely Bell's Palsy    LKWT: 5/6/25 1300  mRS: 0  No Tenecteplase Likely peripheral etiology  no Thrombectomy: no LVO    Recommendations  - Continue Prednisone 60mg for a total of 7 days  - Optho precautions for right eyes, such as eyepatch and artifical tears  - No further inpatient neurological workup at this time,   - Rest of workup per ED/primary team    Case/imaging discussed with neurovascular fellow Cesar Shabazz  under supervision of attending, Ariadna Rodriguez   40yo LHF with recent delivery 7 days ago reports to the hospital 2/2 right facial numbness/droop onset 1300. Neurology/code stroke for right facial droop. Patient reports some strange sensation when applying chap-stick no other focal neurological symptoms. House-Brackman Score 3,2,2. CTH/CTA unremarkable.     Impression: Mild right peripheral CN7 palsy, less concern for intracranial pathology, no signs of venous thrombosis, in absence of any provoking factor, likely Bell's Palsy    LKWT: 5/6/25 1300  mRS: 0  No Tenecteplase Likely peripheral etiology  no Thrombectomy: no LVO    Recommendations  - Continue Prednisone 60mg for a total of 7 days  - Optho precautions for right eyes, such as eyepatch and artifical tears  - Can admit to CDU for MR Brain and IAC w/ and w/o contrast  - Rest of workup per ED/primary team    Case/imaging discussed with neurovascular fellow Cesar Shabazz  under supervision of attending, Ariadna Rodriguez    Recs not final until attending attestation,    38yo LHF with recent delivery 7 days ago reports to the hospital 2/2 right facial numbness/droop onset 1300. Neurology/code stroke for right facial droop. Patient reports some strange sensation when applying chap-stick, patient also reports excessive eye tearing, no other focal neurological symptoms. House-Brackman Score 3,2,2. CTH/CTA unremarkable.     Impression: Mild right facial droop with decreased eye closing strength and decreased frontalis activation, likely Mild right peripheral CN7 palsy, less concern for intracranial pathology, no signs of venous thrombosis, in absence of any provoking factor, likely Bell's Palsy, less likely acute ischemic stroke, can r/o     LKWT: 5/6/25 1300  mRS: 0  No Tenecteplase Likely peripheral etiology  no Thrombectomy: no LVO    Recommendations  - Continue Prednisone 60mg for a total of 7 days  - Optho precautions for right eyes, such as eye-patch and artifical tears  - Can admit to CDU for MR Brain w/o contrast  - Rest of workup per ED/primary team    Case/imaging discussed with neurovascular fellow Cesar Shabazz  under supervision of attending, Ariadna Rodriguez    Recs not final until attending attestation,

## 2025-05-06 NOTE — ED ADULT TRIAGE NOTE - CHIEF COMPLAINT QUOTE
7 days post partum, right sided facial droop and numbness around 1 PM. Still having numbness, droop resolved. Was given 60mg of prednisone at home

## 2025-05-06 NOTE — CONSULT NOTE ADULT - ATTENDING COMMENTS
quick outpaitnet f/u for MRI/V, lyme testing. sent on prednisone, likely bells   Cristopher Nuno MD  Vascular Neurology  Office: [phone]

## 2025-05-06 NOTE — ED ADULT NURSE NOTE - NSFALLUNIVINTERV_ED_ALL_ED
Bed/Stretcher in lowest position, wheels locked, appropriate side rails in place/Call bell, personal items and telephone in reach/Instruct patient to call for assistance before getting out of bed/chair/stretcher/Non-slip footwear applied when patient is off stretcher/[place] to call system/Physically safe environment - no spills, clutter or unnecessary equipment/Purposeful proactive rounding/Room/bathroom lighting operational, light cord in reach

## 2025-05-06 NOTE — CONSULT NOTE ADULT - SUBJECTIVE AND OBJECTIVE BOX
**STROKE CODE CONSULT NOTE**    Last known well time/Time of onset of symptoms: 25 1300    HPI: 40yo RHF with recent delivery 7 days ago reports to the hospital 2/2 right facial numbness/droop onset 1300. Neurology/code stroke for right facial droop. Patient states she was applying chapstick when she felt strange feeling in her face, stating it felt off, pointing to her cheek to her lips. Patient also reports excessive right eye tearing as well. Patient denies any headache, vision loss, changes in taste, changes in hearing. Patient denies any recent fever, chills, sick contact. Patient recently delivered on , . Patient was given 60mg prednisone by  due to concern for Bell's palsy.     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 No nausea, vomiting  Genitourinary: No dysuria, frequency  Neurological: As per HPI      MEDICATIONS  (STANDING):    MEDICATIONS  (PRN):      Allergies    bacitracin (Rash)  hibiclens (Rash)    Intolerances        Vital Signs Last 24 Hrs  T(C): 37 (06 May 2025 16:56), Max: 37 (06 May 2025 16:56)  T(F): 98.6 (06 May 2025 16:56), Max: 98.6 (06 May 2025 16:56)  HR: 79 (06 May 2025 16:56) (79 - 79)  BP: 130/84 (06 May 2025 16:56) (130/84 - 130/84)  BP(mean): --  RR: 16 (06 May 2025 16:56) (16 - 16)  SpO2: 100% (06 May 2025 16:56) (100% - 100%)    Parameters below as of 06 May 2025 16:56  Patient On (Oxygen Delivery Method): room air        PHYSICAL EXAM:  Constitutional: WDWN; NAD  Neurologic:  Mental status: Awake, alert and oriented to name, location, person, age.  Recent and remote memory intact.  Naming, repetition and comprehension intact.  Attention/concentration intact.  No dysarthria  Cranial nerves: Pupils equally round and reactive to light, visual fields full, no nystagmus, extraocular muscles intact, mild right nasolabial flattening, decreased periorbital strength to closing on the right, decreased activation of right frontalis, hearing intact to finger rub, palate elevation symmetric, tongue was midline, sternocleidomastoid/shoulder shrug strength bilaterally 5/5.    Motor:  Normal bulk and tone, strength 5/5 in bilateral upper and lower extremities.   strength 5/5.    Sensation: Intact to light touch in extremities  No neglect.   Coordination: No dysmetria on finger-to-nose and heel-to-shin b/l  Reflexes: Deferred in focused exam  Gait: Narrow and steady. No ataxia.  Romberg negative    NIHSS: 1 (Mild right facial droop)  House Brackmann: 3, 2, 2    Fingerstick Blood Glucose: CAPILLARY BLOOD GLUCOSE      POCT Blood Glucose.: 89 mg/dL (06 May 2025 16:58)       LABS:                    RADIOLOGY & ADDITIONAL STUDIES:    IV-tenecteplase(Y/N):                                   Bolus time:  Reason IV-tenecteplase not given:   **STROKE CODE CONSULT NOTE**    Last known well time/Time of onset of symptoms: 25 1300    HPI: 40yo LHF with recent delivery 7 days ago reports to the hospital 2/2 right facial numbness/droop onset 1300. Neurology/code stroke for right facial droop. Patient states she was applying chapstick when she felt strange feeling in her face, stating it felt off, pointing to her cheek to her lips. Patient also reports excessive right eye tearing as well. Patient denies any headache, vision loss, changes in taste, changes in hearing. Patient denies any recent fever, chills, sick contact. Patient recently delivered on , . Patient was given 60mg prednisone by  due to concern for Bell's palsy.     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 No nausea, vomiting  Genitourinary: No dysuria, frequency  Neurological: As per HPI      MEDICATIONS  (STANDING):    MEDICATIONS  (PRN):      Allergies    bacitracin (Rash)  hibiclens (Rash)    Intolerances        Vital Signs Last 24 Hrs  T(C): 37 (06 May 2025 16:56), Max: 37 (06 May 2025 16:56)  T(F): 98.6 (06 May 2025 16:56), Max: 98.6 (06 May 2025 16:56)  HR: 79 (06 May 2025 16:56) (79 - 79)  BP: 130/84 (06 May 2025 16:56) (130/84 - 130/84)  BP(mean): --  RR: 16 (06 May 2025 16:56) (16 - 16)  SpO2: 100% (06 May 2025 16:56) (100% - 100%)    Parameters below as of 06 May 2025 16:56  Patient On (Oxygen Delivery Method): room air        PHYSICAL EXAM:  Constitutional: WDWN; NAD  Neurologic:  Mental status: Awake, alert and oriented to name, location, person, age.  Recent and remote memory intact.  Naming, repetition and comprehension intact.  Attention/concentration intact.  No dysarthria  Cranial nerves: Pupils equally round and reactive to light, visual fields full, no nystagmus, extraocular muscles intact, minimum right cheek and temporal numbness, mild right nasolabial flattening, decreased periorbital strength to closing on the right, decreased activation of right frontalis, hearing intact to finger rub, palate elevation symmetric, tongue was midline, sternocleidomastoid/shoulder shrug strength bilaterally 5/5.    Motor:  Normal bulk and tone, strength 5/5 in bilateral upper and lower extremities.   strength 5/5.    Sensation: Intact to light touch in extremities  No neglect.   Coordination: No dysmetria on finger-to-nose and heel-to-shin b/l  Reflexes: Deferred in focused exam  Gait: Narrow and steady. No ataxia.  Romberg negative    NIHSS: 1 (Mild right facial droop)  House Brackmann: 3 mouth), 2 (eyes), 2 (forehead)    Fingerstick Blood Glucose: CAPILLARY BLOOD GLUCOSE      POCT Blood Glucose.: 89 mg/dL (06 May 2025 16:58)       LABS:                    RADIOLOGY & ADDITIONAL STUDIES:  CTH/CTA 25  IMPRESSION:  CT HEAD:  1. Gray/white matter differentiation is preserved. No acute intracranial hemorrhage.  2. Mild prominence of the pituitary gland, which nonetheless is within the range of normal variance for postmenopausal patient.    CTA NECK:  1. No evidence of significant stenosis or occlusion. Dominant right vertebral artery.  2. Nodule within the right lobe of the thyroid gland measures approximately 1.1 x 1.3 x 1.9 cm. Consider further assessment via dedicated thyroid ultrasound evaluation on a nonemergent basis for further characterization, as clinically indicated.    CTA HEAD:  1. No large vessel occlusion.  2. Narrowing of the intracranial left vertebral artery following the takeoff of the left posterior inferior cerebellar artery.  3. No aneurysm identified. Tiny aneurysms can be beyond the resolution of CTA technique.  4. Inadequate venous phase opacification. Nonetheless, the visualized venous structures appear to be predominantly patent.

## 2025-05-06 NOTE — ED PROVIDER NOTE - ATTENDING CONTRIBUTION TO CARE
39-year-old female 7 days postpartum, no complications comes to the ER complains of 1 day history of right facial droop.  Patient states this morning developed tearing right eye approxi-1 PM noticed decreased sensation right cheek right droop.  Patient's  MD treated same 60 mg of prednisone for presumed Bell's palsy.  Patient denies fever chills chest pain short of a palpitations.  Physical exam adult female awake alert GCS 15  HEENT normocephalic atraumatic right facial droop, right eye tearing, injected, EOM intact, neck supple  Chest clear A&P.  CV neurological appropriate  Moves all close bowel sounds.  Neuro GCS 15 speech fluent.  Cranial nerves right facial droop very subtle possible involvement right forehead.  Power 5/5 bilateral upper extremities  Wei Johnson MD, Facep

## 2025-05-06 NOTE — ED PROVIDER NOTE - NSFOLLOWUPINSTRUCTIONS_ED_ALL_ED_FT
You should take prednisone for 7 days as originally prescribed.    Results of your CT are included below:    1. No large vessel occlusion  2. Narrowing of the intracranial left vertebral artery following the takeoff of the left posterior inferior cerebellar artery  3. No aneurysm identified. Tiny aneurysms can be beyond the resolution of CTA technique  4. Inadequate venous phase opacification. Nonetheless, the visualized venous structures appear to be predominantly patent    Return to the emergency department for stroke symptoms included worsening facial droop, slurred speech, weakness of the arms or legs. NENA 1. It is important to follow up with your primary care doctor in 1-2 days    follow up with neurology and obtain an MRI tomorrow     2. bring a copy of all your results to your follow up appointments    3. take 60mg of Prednisone daily for 7 days.     you can use an eye patch or artifical tears for right eye.     4. if your symptoms worsen, persist, or if any new symptoms develop, or if you experience any signs of distress, return to the ER right away.

## 2025-05-06 NOTE — ED PROVIDER NOTE - CLINICAL SUMMARY MEDICAL DECISION MAKING FREE TEXT BOX
39-year-old female presents the emergency department for acute onset facial droop noted at 1 PM today.  Patient is 7 days postpartum with an uncomplicated delivery.  She denies any other symptoms at the time including slurred speech, numbness/ting/weakness of her upper or lower extremities, gait instability.  She was prescribed prednisone earlier today for presumed Bell's palsy, no other medications.    Physical exam significant for a middle-aged female matching stated age, skin is warm and well-perfused, normal work of breathing, drooping of right face noted with predominance in the lower face, very mild weakness in the forehead noted.  Strength is 5 out of 5 in upper and lower extremities, sensation intact light touch.    In summary, patient presents for facial droop, code stroke activated.  Neurology at bedside, agreement to obtain CT head and CT angio of head and neck.  Patient does not meet criteria for tenecteplase administration at this time.  Pending final neurology recommendations.

## 2025-05-06 NOTE — ED PROVIDER NOTE - PROGRESS NOTE DETAILS
Discussed with [employer] Pimentel (DO Wei Johnson MD, Facep Alfred Maldonado MD: Neurology evaluated the patient again after stroke imaging returned with no evidence of active ischemia.  They are recommending MRI of brain as well as ICA.  Patient to be evaluated by [facility]U PA for disposition. Had shared decision making dw pt and  (MD). They feel very strongly about dc and will fu in am. PT has arranged MRI in am and will fu with Neuro Risks understood and accepted.   Wei Johnson MD, Facep Magda Gallagher PA-C: patient  states has prednisone 60mg at home for patient. aware patient needs to take 60mg daily for 7 days. states has it at home. aware needs to see neurology  and obtain outpatient mri. states will arrange to have it in am and will follow with neuro. well appearing. strict return precautions discussed. stable for discharge. discussed with ED attending

## 2025-05-06 NOTE — ED PROVIDER NOTE - PATIENT PORTAL LINK FT
You can access the FollowMyHealth Patient Portal offered by [facility] by registering at the following website: [email or web address]. By joining [employer]’s FollowMyHealth portal, you will also be able to view your health information using other applications (apps) compatible with our system.

## 2025-05-06 NOTE — ED ADULT NURSE NOTE - OBJECTIVE STATEMENT
39m yr old female to ED Code stroke with c/o at 1pm while putting chapstick rt side of face felt weird "numb" with rt starting to tear Denies weakness or numbness to extremities Denies facial droop or slurred speech Had Botox x 1 year ago. NIHH scale 1. Denies chest pain or sob Denies fever or chills. Vag delivery 4/30 induced at 39 weeks No problems during pregnancy Denies head inj Responds approp to verbal and tactile stimuli FS 89. Given 60mg steroid prior to arrival. 72.1kg . Able to raise eyebrows. Follows commands. Denies abd pain Denies fever or chills

## 2025-05-06 NOTE — ED PROVIDER NOTE - NSFOLLOWUPCLINICS_GEN_ALL_ED_FT
[facility]  Neurology  [address] [place] [address] [address] [place] [address]  Phone: [phone]  Fax:   Follow Up Time: 1-3 Days

## 2025-05-13 ENCOUNTER — APPOINTMENT (OUTPATIENT)
Age: 40
End: 2025-05-13

## 2025-09-12 ENCOUNTER — APPOINTMENT (OUTPATIENT)
Dept: ULTRASOUND IMAGING | Facility: CLINIC | Age: 40
End: 2025-09-12

## (undated) DEVICE — XI DRAPE COLUMN

## (undated) DEVICE — SUT MONOCRYL 4-0 27" PS-2 UNDYED

## (undated) DEVICE — PACK ROBOTIC

## (undated) DEVICE — SUT VLOC 180 0 9" GS-21 GREEN

## (undated) DEVICE — SOL IRR POUR NS 0.9% 1000ML

## (undated) DEVICE — NDL SPINAL 22G X 3.5" (BLACK)

## (undated) DEVICE — SUT VICRYL 0 27" UR-6

## (undated) DEVICE — POSITIONER FOAM EGG CRATE ULNAR 2PCS (PINK)

## (undated) DEVICE — UTERINE MANIPULATOR CONMED VCARE MED 34MM

## (undated) DEVICE — PREP CHLORAPREP HI-LITE ORANGE 26ML

## (undated) DEVICE — DRSG KERLIX ROLL 4.5"

## (undated) DEVICE — XI OBTURATOR OPTICAL BLADELESS 8MM

## (undated) DEVICE — WARMING BLANKET UPPER ADULT

## (undated) DEVICE — GLV 7.5 PROTEXIS (WHITE)

## (undated) DEVICE — UTERINE MANIPULATOR CONMED VCARE SM 32MM

## (undated) DEVICE — SYR CONTROL LUER LOK 10CC

## (undated) DEVICE — DRAPE ROBOTIC

## (undated) DEVICE — ELCTR GROUNDING PAD ADULT COVIDIEN

## (undated) DEVICE — NDL COUNTER FOAM AND ADHESIVE 40-70

## (undated) DEVICE — VENODYNE/SCD SLEEVE CALF MEDIUM

## (undated) DEVICE — ELCTR CORD FOOTSWITCH 1PLR LAPSCP 10FT

## (undated) DEVICE — DRSG OPSITE 2.5 X 2"

## (undated) DEVICE — SOL IRR POUR H2O 1000ML

## (undated) DEVICE — LIGASURE ATLAS 10MM 20CM

## (undated) DEVICE — TUBING AIRSEAL TRI-LUMEN FILTERED

## (undated) DEVICE — TROCAR SURGIQUEST AIRSEAL 8MMX100MM

## (undated) DEVICE — UTERINE MANIPULATOR CONMED VCARE LG 37MM

## (undated) DEVICE — XI TIP COVER

## (undated) DEVICE — LIGASURE BLUNT TIP 37CM

## (undated) DEVICE — XI SEAL UNIV 5- 8 MM

## (undated) DEVICE — XI DRAPE ARM

## (undated) DEVICE — XI VESSEL SEALER

## (undated) DEVICE — POSITIONER PINK PAD PIGAZZI SYSTEM

## (undated) DEVICE — BLADE SURGICAL #11 CARBON

## (undated) DEVICE — SOL INJ NS 0.9% 100ML

## (undated) DEVICE — SUT VICRYL 2-0 27" CT-2 UNDYED